# Patient Record
Sex: FEMALE | Race: WHITE | HISPANIC OR LATINO | Employment: FULL TIME | URBAN - METROPOLITAN AREA
[De-identification: names, ages, dates, MRNs, and addresses within clinical notes are randomized per-mention and may not be internally consistent; named-entity substitution may affect disease eponyms.]

---

## 2022-12-07 ENCOUNTER — HOSPITAL ENCOUNTER (OUTPATIENT)
Facility: MEDICAL CENTER | Age: 32
End: 2022-12-07
Attending: MIDWIFE
Payer: COMMERCIAL

## 2022-12-07 LAB
25(OH)D3 SERPL-MCNC: 28 NG/ML (ref 30–100)
ABO GROUP BLD: NORMAL
BASOPHILS # BLD AUTO: 0.4 % (ref 0–1.8)
BASOPHILS # BLD: 0.05 K/UL (ref 0–0.12)
BLD GP AB SCN SERPL QL: NORMAL
EOSINOPHIL # BLD AUTO: 0.14 K/UL (ref 0–0.51)
EOSINOPHIL NFR BLD: 1.1 % (ref 0–6.9)
ERYTHROCYTE [DISTWIDTH] IN BLOOD BY AUTOMATED COUNT: 45.1 FL (ref 35.9–50)
HBV SURFACE AG SER QL: ABNORMAL
HCT VFR BLD AUTO: 39.7 % (ref 37–47)
HGB BLD-MCNC: 13 G/DL (ref 12–16)
IMM GRANULOCYTES # BLD AUTO: 0.14 K/UL (ref 0–0.11)
IMM GRANULOCYTES NFR BLD AUTO: 1.1 % (ref 0–0.9)
LYMPHOCYTES # BLD AUTO: 2.54 K/UL (ref 1–4.8)
LYMPHOCYTES NFR BLD: 20.6 % (ref 22–41)
MCH RBC QN AUTO: 27.8 PG (ref 27–33)
MCHC RBC AUTO-ENTMCNC: 32.7 G/DL (ref 33.6–35)
MCV RBC AUTO: 84.8 FL (ref 81.4–97.8)
MONOCYTES # BLD AUTO: 0.67 K/UL (ref 0–0.85)
MONOCYTES NFR BLD AUTO: 5.4 % (ref 0–13.4)
NEUTROPHILS # BLD AUTO: 8.78 K/UL (ref 2–7.15)
NEUTROPHILS NFR BLD: 71.4 % (ref 44–72)
NRBC # BLD AUTO: 0 K/UL
NRBC BLD-RTO: 0 /100 WBC
PLATELET # BLD AUTO: 225 K/UL (ref 164–446)
PMV BLD AUTO: 10.5 FL (ref 9–12.9)
RBC # BLD AUTO: 4.68 M/UL (ref 4.2–5.4)
RH BLD: NORMAL
RUBV AB SER QL: 161 IU/ML
T PALLIDUM AB SER QL IA: ABNORMAL
WBC # BLD AUTO: 12.3 K/UL (ref 4.8–10.8)

## 2022-12-07 PROCEDURE — 85025 COMPLETE CBC W/AUTO DIFF WBC: CPT

## 2022-12-07 PROCEDURE — 86780 TREPONEMA PALLIDUM: CPT

## 2022-12-07 PROCEDURE — 86901 BLOOD TYPING SEROLOGIC RH(D): CPT

## 2022-12-07 PROCEDURE — 86592 SYPHILIS TEST NON-TREP QUAL: CPT

## 2022-12-07 PROCEDURE — 86900 BLOOD TYPING SEROLOGIC ABO: CPT

## 2022-12-07 PROCEDURE — 87340 HEPATITIS B SURFACE AG IA: CPT

## 2022-12-07 PROCEDURE — 86762 RUBELLA ANTIBODY: CPT

## 2022-12-07 PROCEDURE — 86850 RBC ANTIBODY SCREEN: CPT

## 2022-12-07 PROCEDURE — 82306 VITAMIN D 25 HYDROXY: CPT

## 2023-03-01 ENCOUNTER — HOSPITAL ENCOUNTER (OUTPATIENT)
Facility: MEDICAL CENTER | Age: 33
End: 2023-03-01
Attending: OBSTETRICS & GYNECOLOGY
Payer: COMMERCIAL

## 2023-03-01 LAB
GLUCOSE SERPL-MCNC: 106 MG/DL (ref 65–99)
IRON SERPL-MCNC: 86 UG/DL (ref 40–170)

## 2023-03-01 PROCEDURE — 83540 ASSAY OF IRON: CPT

## 2023-03-01 PROCEDURE — 82947 ASSAY GLUCOSE BLOOD QUANT: CPT

## 2023-05-10 ENCOUNTER — HOSPITAL ENCOUNTER (OUTPATIENT)
Facility: MEDICAL CENTER | Age: 33
End: 2023-05-10
Attending: MIDWIFE
Payer: COMMERCIAL

## 2023-05-10 PROCEDURE — 87081 CULTURE SCREEN ONLY: CPT

## 2023-05-10 PROCEDURE — 87150 DNA/RNA AMPLIFIED PROBE: CPT

## 2023-05-11 LAB — GP B STREP DNA SPEC QL NAA+PROBE: NEGATIVE

## 2023-05-28 ENCOUNTER — HOSPITAL ENCOUNTER (INPATIENT)
Facility: MEDICAL CENTER | Age: 33
LOS: 5 days | End: 2023-06-02
Attending: OBSTETRICS & GYNECOLOGY | Admitting: OBSTETRICS & GYNECOLOGY
Payer: COMMERCIAL

## 2023-05-28 DIAGNOSIS — G89.18 ACUTE POST-OPERATIVE PAIN: ICD-10-CM

## 2023-05-28 DIAGNOSIS — O92.70 LACTATION PROBLEM: Primary | ICD-10-CM

## 2023-05-28 PROCEDURE — 85025 COMPLETE CBC W/AUTO DIFF WBC: CPT

## 2023-05-28 PROCEDURE — 36415 COLL VENOUS BLD VENIPUNCTURE: CPT

## 2023-05-28 PROCEDURE — 86780 TREPONEMA PALLIDUM: CPT

## 2023-05-28 PROCEDURE — 770002 HCHG ROOM/CARE - OB PRIVATE (112)

## 2023-05-28 PROCEDURE — 302449 STATCHG TRIAGE ONLY (STATISTIC)

## 2023-05-28 RX ORDER — TERBUTALINE SULFATE 1 MG/ML
0.25 INJECTION, SOLUTION SUBCUTANEOUS
Status: DISCONTINUED | OUTPATIENT
Start: 2023-05-28 | End: 2023-05-30 | Stop reason: HOSPADM

## 2023-05-28 RX ORDER — MISOPROSTOL 200 UG/1
800 TABLET ORAL
Status: DISCONTINUED | OUTPATIENT
Start: 2023-05-28 | End: 2023-05-30 | Stop reason: HOSPADM

## 2023-05-28 RX ORDER — ACETAMINOPHEN 500 MG
1000 TABLET ORAL
Status: COMPLETED | OUTPATIENT
Start: 2023-05-28 | End: 2023-05-29

## 2023-05-28 RX ORDER — OXYTOCIN 10 [USP'U]/ML
10 INJECTION, SOLUTION INTRAMUSCULAR; INTRAVENOUS
Status: DISCONTINUED | OUTPATIENT
Start: 2023-05-28 | End: 2023-05-30 | Stop reason: HOSPADM

## 2023-05-28 RX ORDER — IBUPROFEN 800 MG/1
800 TABLET ORAL
Status: DISCONTINUED | OUTPATIENT
Start: 2023-05-28 | End: 2023-05-30 | Stop reason: HOSPADM

## 2023-05-28 RX ORDER — LIDOCAINE HYDROCHLORIDE 10 MG/ML
20 INJECTION, SOLUTION INFILTRATION; PERINEURAL
Status: DISCONTINUED | OUTPATIENT
Start: 2023-05-28 | End: 2023-05-30 | Stop reason: HOSPADM

## 2023-05-28 RX ORDER — SODIUM CHLORIDE, SODIUM LACTATE, POTASSIUM CHLORIDE, CALCIUM CHLORIDE 600; 310; 30; 20 MG/100ML; MG/100ML; MG/100ML; MG/100ML
INJECTION, SOLUTION INTRAVENOUS CONTINUOUS
Status: DISCONTINUED | OUTPATIENT
Start: 2023-05-28 | End: 2023-05-30 | Stop reason: HOSPADM

## 2023-05-28 RX ORDER — METHYLERGONOVINE MALEATE 0.2 MG/ML
0.2 INJECTION INTRAVENOUS
Status: COMPLETED | OUTPATIENT
Start: 2023-05-28 | End: 2023-05-29

## 2023-05-28 ASSESSMENT — PATIENT HEALTH QUESTIONNAIRE - PHQ9
1. LITTLE INTEREST OR PLEASURE IN DOING THINGS: NOT AT ALL
2. FEELING DOWN, DEPRESSED, IRRITABLE, OR HOPELESS: NOT AT ALL
SUM OF ALL RESPONSES TO PHQ9 QUESTIONS 1 AND 2: 0

## 2023-05-28 ASSESSMENT — LIFESTYLE VARIABLES
TOTAL SCORE: 0
ALCOHOL_USE: NO
HAVE PEOPLE ANNOYED YOU BY CRITICIZING YOUR DRINKING: NO
ON A TYPICAL DAY WHEN YOU DRINK ALCOHOL HOW MANY DRINKS DO YOU HAVE: 0
EVER FELT BAD OR GUILTY ABOUT YOUR DRINKING: NO
CONSUMPTION TOTAL: NEGATIVE
TOTAL SCORE: 0
DOES PATIENT WANT TO STOP DRINKING: NO
HAVE YOU EVER FELT YOU SHOULD CUT DOWN ON YOUR DRINKING: NO
TOTAL SCORE: 0
EVER_SMOKED: NEVER
HOW MANY TIMES IN THE PAST YEAR HAVE YOU HAD 5 OR MORE DRINKS IN A DAY: 0
AVERAGE NUMBER OF DAYS PER WEEK YOU HAVE A DRINK CONTAINING ALCOHOL: 0
EVER HAD A DRINK FIRST THING IN THE MORNING TO STEADY YOUR NERVES TO GET RID OF A HANGOVER: NO

## 2023-05-28 ASSESSMENT — PAIN DESCRIPTION - PAIN TYPE: TYPE: ACUTE PAIN

## 2023-05-29 ENCOUNTER — ANESTHESIA (OUTPATIENT)
Dept: OBGYN | Facility: MEDICAL CENTER | Age: 33
End: 2023-05-29
Payer: COMMERCIAL

## 2023-05-29 ENCOUNTER — ANESTHESIA EVENT (OUTPATIENT)
Dept: OBGYN | Facility: MEDICAL CENTER | Age: 33
End: 2023-05-29
Payer: COMMERCIAL

## 2023-05-29 LAB
BASOPHILS # BLD AUTO: 0.6 % (ref 0–1.8)
BASOPHILS # BLD: 0.07 K/UL (ref 0–0.12)
EOSINOPHIL # BLD AUTO: 0.06 K/UL (ref 0–0.51)
EOSINOPHIL NFR BLD: 0.5 % (ref 0–6.9)
ERYTHROCYTE [DISTWIDTH] IN BLOOD BY AUTOMATED COUNT: 41.5 FL (ref 35.9–50)
HCT VFR BLD AUTO: 40 % (ref 37–47)
HCV AB SER QL: NORMAL
HGB BLD-MCNC: 13.5 G/DL (ref 12–16)
HIV 1+2 AB+HIV1 P24 AG SERPL QL IA: NORMAL
HOLDING TUBE BB 8507: NORMAL
IMM GRANULOCYTES # BLD AUTO: 0.12 K/UL (ref 0–0.11)
IMM GRANULOCYTES NFR BLD AUTO: 1 % (ref 0–0.9)
LYMPHOCYTES # BLD AUTO: 2.7 K/UL (ref 1–4.8)
LYMPHOCYTES NFR BLD: 23.5 % (ref 22–41)
MCH RBC QN AUTO: 29.2 PG (ref 27–33)
MCHC RBC AUTO-ENTMCNC: 33.8 G/DL (ref 32.2–35.5)
MCV RBC AUTO: 86.6 FL (ref 81.4–97.8)
MONOCYTES # BLD AUTO: 1.16 K/UL (ref 0–0.85)
MONOCYTES NFR BLD AUTO: 10.1 % (ref 0–13.4)
NEUTROPHILS # BLD AUTO: 7.36 K/UL (ref 1.82–7.42)
NEUTROPHILS NFR BLD: 64.3 % (ref 44–72)
NRBC # BLD AUTO: 0 K/UL
NRBC BLD-RTO: 0 /100 WBC (ref 0–0.2)
PLATELET # BLD AUTO: 226 K/UL (ref 164–446)
PMV BLD AUTO: 11.3 FL (ref 9–12.9)
RBC # BLD AUTO: 4.62 M/UL (ref 4.2–5.4)
T PALLIDUM AB SER QL IA: NORMAL
WBC # BLD AUTO: 11.5 K/UL (ref 4.8–10.8)

## 2023-05-29 PROCEDURE — 36415 COLL VENOUS BLD VENIPUNCTURE: CPT

## 2023-05-29 PROCEDURE — 700102 HCHG RX REV CODE 250 W/ 637 OVERRIDE(OP): Performed by: OBSTETRICS & GYNECOLOGY

## 2023-05-29 PROCEDURE — 700111 HCHG RX REV CODE 636 W/ 250 OVERRIDE (IP): Performed by: OBSTETRICS & GYNECOLOGY

## 2023-05-29 PROCEDURE — 700101 HCHG RX REV CODE 250: Performed by: ANESTHESIOLOGY

## 2023-05-29 PROCEDURE — 160041 HCHG SURGERY MINUTES - EA ADDL 1 MIN LEVEL 4: Performed by: OBSTETRICS & GYNECOLOGY

## 2023-05-29 PROCEDURE — 160029 HCHG SURGERY MINUTES - 1ST 30 MINS LEVEL 4: Performed by: OBSTETRICS & GYNECOLOGY

## 2023-05-29 PROCEDURE — 700111 HCHG RX REV CODE 636 W/ 250 OVERRIDE (IP): Performed by: ANESTHESIOLOGY

## 2023-05-29 PROCEDURE — 700111 HCHG RX REV CODE 636 W/ 250 OVERRIDE (IP): Performed by: PHYSICIAN ASSISTANT

## 2023-05-29 PROCEDURE — 59514 CESAREAN DELIVERY ONLY: CPT | Mod: AS | Performed by: ADVANCED PRACTICE MIDWIFE

## 2023-05-29 PROCEDURE — 87389 HIV-1 AG W/HIV-1&-2 AB AG IA: CPT

## 2023-05-29 PROCEDURE — 160048 HCHG OR STATISTICAL LEVEL 1-5: Performed by: OBSTETRICS & GYNECOLOGY

## 2023-05-29 PROCEDURE — 59514 CESAREAN DELIVERY ONLY: CPT | Performed by: OBSTETRICS & GYNECOLOGY

## 2023-05-29 PROCEDURE — 86803 HEPATITIS C AB TEST: CPT

## 2023-05-29 PROCEDURE — 01967 NEURAXL LBR ANES VAG DLVR: CPT | Performed by: ANESTHESIOLOGY

## 2023-05-29 PROCEDURE — 303615 HCHG EPIDURAL/SPINAL ANESTHESIA FOR LABOR

## 2023-05-29 PROCEDURE — 160035 HCHG PACU - 1ST 60 MINS PHASE I: Performed by: OBSTETRICS & GYNECOLOGY

## 2023-05-29 PROCEDURE — 160002 HCHG RECOVERY MINUTES (STAT): Performed by: OBSTETRICS & GYNECOLOGY

## 2023-05-29 PROCEDURE — A9270 NON-COVERED ITEM OR SERVICE: HCPCS | Performed by: OBSTETRICS & GYNECOLOGY

## 2023-05-29 PROCEDURE — A9270 NON-COVERED ITEM OR SERVICE: HCPCS | Performed by: PHYSICIAN ASSISTANT

## 2023-05-29 PROCEDURE — 700105 HCHG RX REV CODE 258: Performed by: PHYSICIAN ASSISTANT

## 2023-05-29 PROCEDURE — 700102 HCHG RX REV CODE 250 W/ 637 OVERRIDE(OP): Performed by: ANESTHESIOLOGY

## 2023-05-29 PROCEDURE — A9270 NON-COVERED ITEM OR SERVICE: HCPCS | Performed by: ANESTHESIOLOGY

## 2023-05-29 PROCEDURE — C1755 CATHETER, INTRASPINAL: HCPCS | Performed by: OBSTETRICS & GYNECOLOGY

## 2023-05-29 PROCEDURE — 700102 HCHG RX REV CODE 250 W/ 637 OVERRIDE(OP): Performed by: PHYSICIAN ASSISTANT

## 2023-05-29 PROCEDURE — 700105 HCHG RX REV CODE 258: Performed by: OBSTETRICS & GYNECOLOGY

## 2023-05-29 PROCEDURE — 770002 HCHG ROOM/CARE - OB PRIVATE (112)

## 2023-05-29 PROCEDURE — 01968 ANES/ANALG CS DLVR NEURAXIAL: CPT | Performed by: ANESTHESIOLOGY

## 2023-05-29 PROCEDURE — 160009 HCHG ANES TIME/MIN: Performed by: OBSTETRICS & GYNECOLOGY

## 2023-05-29 RX ORDER — HALOPERIDOL 5 MG/ML
1 INJECTION INTRAMUSCULAR
Status: CANCELLED | OUTPATIENT
Start: 2023-05-29

## 2023-05-29 RX ORDER — SODIUM CHLORIDE 9 MG/ML
INJECTION, SOLUTION INTRAVENOUS
Status: COMPLETED
Start: 2023-05-29 | End: 2023-05-29

## 2023-05-29 RX ORDER — METOCLOPRAMIDE HYDROCHLORIDE 5 MG/ML
10 INJECTION INTRAMUSCULAR; INTRAVENOUS ONCE
Status: COMPLETED | OUTPATIENT
Start: 2023-05-29 | End: 2023-05-29

## 2023-05-29 RX ORDER — SODIUM CHLORIDE, SODIUM GLUCONATE, SODIUM ACETATE, POTASSIUM CHLORIDE AND MAGNESIUM CHLORIDE 526; 502; 368; 37; 30 MG/100ML; MG/100ML; MG/100ML; MG/100ML; MG/100ML
1000 INJECTION, SOLUTION INTRAVENOUS ONCE
Status: DISCONTINUED | OUTPATIENT
Start: 2023-05-29 | End: 2023-05-29 | Stop reason: CLARIF

## 2023-05-29 RX ORDER — CLINDAMYCIN PHOSPHATE 900 MG/50ML
900 INJECTION, SOLUTION INTRAVENOUS EVERY 8 HOURS
Status: COMPLETED | OUTPATIENT
Start: 2023-05-29 | End: 2023-05-30

## 2023-05-29 RX ORDER — AZITHROMYCIN 500 MG/5ML
500 INJECTION, POWDER, LYOPHILIZED, FOR SOLUTION INTRAVENOUS EVERY 24 HOURS
Status: DISCONTINUED | OUTPATIENT
Start: 2023-05-30 | End: 2023-05-29

## 2023-05-29 RX ORDER — OXYCODONE HCL 5 MG/5 ML
5 SOLUTION, ORAL ORAL
Status: CANCELLED | OUTPATIENT
Start: 2023-05-29

## 2023-05-29 RX ORDER — DIPHENHYDRAMINE HYDROCHLORIDE 50 MG/ML
12.5 INJECTION INTRAMUSCULAR; INTRAVENOUS
Status: CANCELLED | OUTPATIENT
Start: 2023-05-29

## 2023-05-29 RX ORDER — PHENYLEPHRINE HYDROCHLORIDE 10 MG/ML
INJECTION, SOLUTION INTRAMUSCULAR; INTRAVENOUS; SUBCUTANEOUS PRN
Status: DISCONTINUED | OUTPATIENT
Start: 2023-05-29 | End: 2023-05-29 | Stop reason: SURG

## 2023-05-29 RX ORDER — HYDROMORPHONE HYDROCHLORIDE 1 MG/ML
0.2 INJECTION, SOLUTION INTRAMUSCULAR; INTRAVENOUS; SUBCUTANEOUS
Status: CANCELLED | OUTPATIENT
Start: 2023-05-29

## 2023-05-29 RX ORDER — LABETALOL HYDROCHLORIDE 5 MG/ML
5 INJECTION, SOLUTION INTRAVENOUS
Status: CANCELLED | OUTPATIENT
Start: 2023-05-29

## 2023-05-29 RX ORDER — ONDANSETRON 2 MG/ML
4 INJECTION INTRAMUSCULAR; INTRAVENOUS
Status: CANCELLED | OUTPATIENT
Start: 2023-05-29

## 2023-05-29 RX ORDER — METOCLOPRAMIDE HYDROCHLORIDE 5 MG/ML
10 INJECTION INTRAMUSCULAR; INTRAVENOUS ONCE
Status: DISCONTINUED | OUTPATIENT
Start: 2023-05-29 | End: 2023-05-29 | Stop reason: CLARIF

## 2023-05-29 RX ORDER — OXYCODONE HYDROCHLORIDE 10 MG/1
10 TABLET ORAL EVERY 4 HOURS PRN
Status: ACTIVE | OUTPATIENT
Start: 2023-05-29 | End: 2023-05-30

## 2023-05-29 RX ORDER — ONDANSETRON 2 MG/ML
INJECTION INTRAMUSCULAR; INTRAVENOUS PRN
Status: DISCONTINUED | OUTPATIENT
Start: 2023-05-29 | End: 2023-05-29 | Stop reason: SURG

## 2023-05-29 RX ORDER — HYDROMORPHONE HYDROCHLORIDE 1 MG/ML
0.2 INJECTION, SOLUTION INTRAMUSCULAR; INTRAVENOUS; SUBCUTANEOUS
Status: ACTIVE | OUTPATIENT
Start: 2023-05-29 | End: 2023-05-30

## 2023-05-29 RX ORDER — MEPERIDINE HYDROCHLORIDE 25 MG/ML
6.25 INJECTION INTRAMUSCULAR; INTRAVENOUS; SUBCUTANEOUS
Status: CANCELLED | OUTPATIENT
Start: 2023-05-29

## 2023-05-29 RX ORDER — DIPHENHYDRAMINE HYDROCHLORIDE 50 MG/ML
12.5 INJECTION INTRAMUSCULAR; INTRAVENOUS EVERY 6 HOURS PRN
Status: ACTIVE | OUTPATIENT
Start: 2023-05-29 | End: 2023-05-30

## 2023-05-29 RX ORDER — HYDROMORPHONE HYDROCHLORIDE 1 MG/ML
0.4 INJECTION, SOLUTION INTRAMUSCULAR; INTRAVENOUS; SUBCUTANEOUS
Status: ACTIVE | OUTPATIENT
Start: 2023-05-29 | End: 2023-05-30

## 2023-05-29 RX ORDER — METHYLERGONOVINE MALEATE 0.2 MG/ML
INJECTION INTRAVENOUS PRN
Status: DISCONTINUED | OUTPATIENT
Start: 2023-05-29 | End: 2023-05-29 | Stop reason: SURG

## 2023-05-29 RX ORDER — LIDOCAINE HYDROCHLORIDE AND EPINEPHRINE 15; 5 MG/ML; UG/ML
INJECTION, SOLUTION EPIDURAL
Status: COMPLETED | OUTPATIENT
Start: 2023-05-29 | End: 2023-05-29

## 2023-05-29 RX ORDER — EPHEDRINE SULFATE 50 MG/ML
10 INJECTION, SOLUTION INTRAVENOUS
Status: ACTIVE | OUTPATIENT
Start: 2023-05-29 | End: 2023-05-30

## 2023-05-29 RX ORDER — MORPHINE SULFATE 0.5 MG/ML
INJECTION, SOLUTION EPIDURAL; INTRATHECAL; INTRAVENOUS PRN
Status: DISCONTINUED | OUTPATIENT
Start: 2023-05-29 | End: 2023-05-29 | Stop reason: SURG

## 2023-05-29 RX ORDER — ACETAMINOPHEN 500 MG
1000 TABLET ORAL EVERY 6 HOURS
Status: DISPENSED | OUTPATIENT
Start: 2023-05-29 | End: 2023-05-30

## 2023-05-29 RX ORDER — HYDROMORPHONE HYDROCHLORIDE 1 MG/ML
0.4 INJECTION, SOLUTION INTRAMUSCULAR; INTRAVENOUS; SUBCUTANEOUS
Status: CANCELLED | OUTPATIENT
Start: 2023-05-29

## 2023-05-29 RX ORDER — EPHEDRINE SULFATE 50 MG/ML
5 INJECTION, SOLUTION INTRAVENOUS
Status: CANCELLED | OUTPATIENT
Start: 2023-05-29

## 2023-05-29 RX ORDER — CLINDAMYCIN PHOSPHATE 600 MG/50ML
600 INJECTION, SOLUTION INTRAVENOUS EVERY 8 HOURS
Status: DISCONTINUED | OUTPATIENT
Start: 2023-05-29 | End: 2023-05-29

## 2023-05-29 RX ORDER — CITRIC ACID/SODIUM CITRATE 334-500MG
30 SOLUTION, ORAL ORAL ONCE
Status: DISCONTINUED | OUTPATIENT
Start: 2023-05-29 | End: 2023-05-29 | Stop reason: CLARIF

## 2023-05-29 RX ORDER — ONDANSETRON 2 MG/ML
4 INJECTION INTRAMUSCULAR; INTRAVENOUS EVERY 6 HOURS PRN
Status: ACTIVE | OUTPATIENT
Start: 2023-05-29 | End: 2023-05-30

## 2023-05-29 RX ORDER — DEXTROSE, SODIUM CHLORIDE, SODIUM LACTATE, POTASSIUM CHLORIDE, AND CALCIUM CHLORIDE 5; .6; .31; .03; .02 G/100ML; G/100ML; G/100ML; G/100ML; G/100ML
INJECTION, SOLUTION INTRAVENOUS CONTINUOUS
Status: DISCONTINUED | OUTPATIENT
Start: 2023-05-29 | End: 2023-05-30

## 2023-05-29 RX ORDER — SODIUM CHLORIDE, SODIUM LACTATE, POTASSIUM CHLORIDE, AND CALCIUM CHLORIDE .6; .31; .03; .02 G/100ML; G/100ML; G/100ML; G/100ML
1000 INJECTION, SOLUTION INTRAVENOUS
Status: DISCONTINUED | OUTPATIENT
Start: 2023-05-29 | End: 2023-05-30 | Stop reason: HOSPADM

## 2023-05-29 RX ORDER — AZITHROMYCIN 500 MG/5ML
500 INJECTION, POWDER, LYOPHILIZED, FOR SOLUTION INTRAVENOUS ONCE
Status: COMPLETED | OUTPATIENT
Start: 2023-05-29 | End: 2023-05-30

## 2023-05-29 RX ORDER — BUPIVACAINE HYDROCHLORIDE 7.5 MG/ML
INJECTION, SOLUTION INTRASPINAL PRN
Status: DISCONTINUED | OUTPATIENT
Start: 2023-05-29 | End: 2023-05-29 | Stop reason: SURG

## 2023-05-29 RX ORDER — DIPHENHYDRAMINE HYDROCHLORIDE 50 MG/ML
25 INJECTION INTRAMUSCULAR; INTRAVENOUS EVERY 6 HOURS PRN
Status: ACTIVE | OUTPATIENT
Start: 2023-05-29 | End: 2023-05-30

## 2023-05-29 RX ORDER — MIDAZOLAM HYDROCHLORIDE 1 MG/ML
1 INJECTION INTRAMUSCULAR; INTRAVENOUS
Status: CANCELLED | OUTPATIENT
Start: 2023-05-29

## 2023-05-29 RX ORDER — HYDRALAZINE HYDROCHLORIDE 20 MG/ML
5 INJECTION INTRAMUSCULAR; INTRAVENOUS
Status: CANCELLED | OUTPATIENT
Start: 2023-05-29

## 2023-05-29 RX ORDER — ROPIVACAINE HYDROCHLORIDE 2 MG/ML
INJECTION, SOLUTION EPIDURAL; INFILTRATION; PERINEURAL CONTINUOUS
Status: DISCONTINUED | OUTPATIENT
Start: 2023-05-29 | End: 2023-05-30 | Stop reason: HOSPADM

## 2023-05-29 RX ORDER — ROPIVACAINE HYDROCHLORIDE 2 MG/ML
INJECTION, SOLUTION EPIDURAL; INFILTRATION; PERINEURAL
Status: ACTIVE
Start: 2023-05-29 | End: 2023-05-29

## 2023-05-29 RX ORDER — OXYCODONE HYDROCHLORIDE 5 MG/1
5 TABLET ORAL EVERY 4 HOURS PRN
Status: ACTIVE | OUTPATIENT
Start: 2023-05-29 | End: 2023-05-30

## 2023-05-29 RX ORDER — SODIUM CHLORIDE, SODIUM GLUCONATE, SODIUM ACETATE, POTASSIUM CHLORIDE AND MAGNESIUM CHLORIDE 526; 502; 368; 37; 30 MG/100ML; MG/100ML; MG/100ML; MG/100ML; MG/100ML
1000 INJECTION, SOLUTION INTRAVENOUS ONCE
Status: COMPLETED | OUTPATIENT
Start: 2023-05-29 | End: 2023-05-29

## 2023-05-29 RX ORDER — METHYLERGONOVINE MALEATE 0.2 MG/ML
INJECTION INTRAVENOUS
Status: COMPLETED
Start: 2023-05-29 | End: 2023-05-29

## 2023-05-29 RX ORDER — CITRIC ACID/SODIUM CITRATE 334-500MG
30 SOLUTION, ORAL ORAL ONCE
Status: COMPLETED | OUTPATIENT
Start: 2023-05-29 | End: 2023-05-29

## 2023-05-29 RX ORDER — KETOROLAC TROMETHAMINE 30 MG/ML
INJECTION, SOLUTION INTRAMUSCULAR; INTRAVENOUS PRN
Status: DISCONTINUED | OUTPATIENT
Start: 2023-05-29 | End: 2023-05-29 | Stop reason: SURG

## 2023-05-29 RX ORDER — SCOLOPAMINE TRANSDERMAL SYSTEM 1 MG/1
PATCH, EXTENDED RELEASE TRANSDERMAL PRN
Status: DISCONTINUED | OUTPATIENT
Start: 2023-05-29 | End: 2023-05-29 | Stop reason: SURG

## 2023-05-29 RX ORDER — SODIUM CHLORIDE, SODIUM LACTATE, POTASSIUM CHLORIDE, AND CALCIUM CHLORIDE .6; .31; .03; .02 G/100ML; G/100ML; G/100ML; G/100ML
250 INJECTION, SOLUTION INTRAVENOUS PRN
Status: CANCELLED | OUTPATIENT
Start: 2023-05-29

## 2023-05-29 RX ORDER — SODIUM CHLORIDE, SODIUM GLUCONATE, SODIUM ACETATE, POTASSIUM CHLORIDE AND MAGNESIUM CHLORIDE 526; 502; 368; 37; 30 MG/100ML; MG/100ML; MG/100ML; MG/100ML; MG/100ML
INJECTION, SOLUTION INTRAVENOUS
Status: DISCONTINUED | OUTPATIENT
Start: 2023-05-29 | End: 2023-05-29 | Stop reason: SURG

## 2023-05-29 RX ORDER — HYDROMORPHONE HYDROCHLORIDE 1 MG/ML
0.1 INJECTION, SOLUTION INTRAMUSCULAR; INTRAVENOUS; SUBCUTANEOUS
Status: CANCELLED | OUTPATIENT
Start: 2023-05-29

## 2023-05-29 RX ORDER — AMPICILLIN 2 G/1
INJECTION, POWDER, FOR SOLUTION INTRAVENOUS
Status: COMPLETED
Start: 2023-05-29 | End: 2023-05-29

## 2023-05-29 RX ORDER — KETOROLAC TROMETHAMINE 30 MG/ML
30 INJECTION, SOLUTION INTRAMUSCULAR; INTRAVENOUS EVERY 6 HOURS
Status: DISPENSED | OUTPATIENT
Start: 2023-05-29 | End: 2023-05-30

## 2023-05-29 RX ORDER — OXYCODONE HCL 5 MG/5 ML
10 SOLUTION, ORAL ORAL
Status: CANCELLED | OUTPATIENT
Start: 2023-05-29

## 2023-05-29 RX ADMIN — KETOROLAC TROMETHAMINE 30 MG: 30 INJECTION, SOLUTION INTRAMUSCULAR; INTRAVENOUS at 22:47

## 2023-05-29 RX ADMIN — OXYTOCIN 700 ML: 10 INJECTION, SOLUTION INTRAMUSCULAR; INTRAVENOUS at 22:47

## 2023-05-29 RX ADMIN — METOCLOPRAMIDE 10 MG: 5 INJECTION, SOLUTION INTRAMUSCULAR; INTRAVENOUS at 21:42

## 2023-05-29 RX ADMIN — AZITHROMYCIN FOR INJECTION INJECTION, POWDER, LYOPHILIZED, FOR SOLUTION 500 MG: 500 INJECTION INTRAVENOUS at 23:19

## 2023-05-29 RX ADMIN — ACETAMINOPHEN 1000 MG: 500 TABLET, FILM COATED ORAL at 21:23

## 2023-05-29 RX ADMIN — LIDOCAINE HYDROCHLORIDE,EPINEPHRINE BITARTRATE 2 ML: 15; .005 INJECTION, SOLUTION EPIDURAL; INFILTRATION; INTRACAUDAL; PERINEURAL at 07:55

## 2023-05-29 RX ADMIN — AZITHROMYCIN FOR INJECTION INJECTION, POWDER, LYOPHILIZED, FOR SOLUTION 500 MG: 500 INJECTION INTRAVENOUS at 21:59

## 2023-05-29 RX ADMIN — ONDANSETRON 4 MG: 2 INJECTION INTRAMUSCULAR; INTRAVENOUS at 22:47

## 2023-05-29 RX ADMIN — SCOPOLAMINE 1 PATCH: 1.5 PATCH, EXTENDED RELEASE TRANSDERMAL at 22:05

## 2023-05-29 RX ADMIN — GENTAMICIN SULFATE 260 MG: 40 INJECTION, SOLUTION INTRAMUSCULAR; INTRAVENOUS at 23:56

## 2023-05-29 RX ADMIN — MORPHINE SULFATE 100 MCG: 0.5 INJECTION, SOLUTION EPIDURAL; INTRATHECAL; INTRAVENOUS at 21:58

## 2023-05-29 RX ADMIN — FENTANYL CITRATE 10 MCG: 50 INJECTION, SOLUTION INTRAMUSCULAR; INTRAVENOUS at 21:58

## 2023-05-29 RX ADMIN — OXYTOCIN 125 ML/HR: 10 INJECTION, SOLUTION INTRAMUSCULAR; INTRAVENOUS at 23:55

## 2023-05-29 RX ADMIN — FAMOTIDINE 20 MG: 10 INJECTION, SOLUTION INTRAVENOUS at 21:42

## 2023-05-29 RX ADMIN — AMPICILLIN SODIUM 2000 MG: 2 INJECTION, POWDER, FOR SOLUTION INTRAVENOUS at 21:23

## 2023-05-29 RX ADMIN — ROPIVACAINE HYDROCHLORIDE: 2 INJECTION, SOLUTION EPIDURAL; INFILTRATION at 18:09

## 2023-05-29 RX ADMIN — PHENYLEPHRINE HYDROCHLORIDE 100 MCG: 10 INJECTION INTRAVENOUS at 22:27

## 2023-05-29 RX ADMIN — METHYLERGONOVINE MALEATE 200 MCG: 0.2 INJECTION, SOLUTION INTRAMUSCULAR; INTRAVENOUS at 22:25

## 2023-05-29 RX ADMIN — LIDOCAINE HYDROCHLORIDE,EPINEPHRINE BITARTRATE 3 ML: 15; .005 INJECTION, SOLUTION EPIDURAL; INFILTRATION; INTRACAUDAL; PERINEURAL at 07:42

## 2023-05-29 RX ADMIN — ROPIVACAINE HYDROCHLORIDE: 2 INJECTION, SOLUTION EPIDURAL; INFILTRATION at 08:16

## 2023-05-29 RX ADMIN — SODIUM CITRATE AND CITRIC ACID MONOHYDRATE 30 ML: 500; 334 SOLUTION ORAL at 21:42

## 2023-05-29 RX ADMIN — OXYTOCIN 1 MILLI-UNITS/MIN: 10 INJECTION, SOLUTION INTRAMUSCULAR; INTRAVENOUS at 02:37

## 2023-05-29 RX ADMIN — SODIUM CHLORIDE, POTASSIUM CHLORIDE, SODIUM LACTATE AND CALCIUM CHLORIDE: 600; 310; 30; 20 INJECTION, SOLUTION INTRAVENOUS at 15:14

## 2023-05-29 RX ADMIN — PHENYLEPHRINE HYDROCHLORIDE 200 MCG: 10 INJECTION INTRAVENOUS at 22:04

## 2023-05-29 RX ADMIN — SODIUM CHLORIDE, SODIUM GLUCONATE, SODIUM ACETATE, POTASSIUM CHLORIDE AND MAGNESIUM CHLORIDE 1000 ML: 526; 502; 368; 37; 30 INJECTION, SOLUTION INTRAVENOUS at 21:20

## 2023-05-29 RX ADMIN — SODIUM CHLORIDE, POTASSIUM CHLORIDE, SODIUM LACTATE AND CALCIUM CHLORIDE: 600; 310; 30; 20 INJECTION, SOLUTION INTRAVENOUS at 02:28

## 2023-05-29 RX ADMIN — SODIUM CHLORIDE, SODIUM GLUCONATE, SODIUM ACETATE, POTASSIUM CHLORIDE AND MAGNESIUM CHLORIDE: 526; 502; 368; 37; 30 INJECTION, SOLUTION INTRAVENOUS at 21:09

## 2023-05-29 RX ADMIN — BUPIVACAINE HYDROCHLORIDE IN DEXTROSE 1.5 ML: 7.5 INJECTION, SOLUTION SUBARACHNOID at 21:58

## 2023-05-29 ASSESSMENT — PATIENT HEALTH QUESTIONNAIRE - PHQ9
SUM OF ALL RESPONSES TO PHQ9 QUESTIONS 1 AND 2: 0
1. LITTLE INTEREST OR PLEASURE IN DOING THINGS: NOT AT ALL
2. FEELING DOWN, DEPRESSED, IRRITABLE, OR HOPELESS: NOT AT ALL

## 2023-05-29 ASSESSMENT — PAIN DESCRIPTION - PAIN TYPE
TYPE: ACUTE PAIN

## 2023-05-29 ASSESSMENT — PAIN SCALES - GENERAL: PAIN_LEVEL: 0

## 2023-05-29 NOTE — ANESTHESIA PREPROCEDURE EVALUATION
Date: 05/29/23  Procedure: Labor Epidural         Relevant Problems   No relevant active problems       Physical Exam    Airway   Mallampati: I  Neck ROM: full       Cardiovascular - normal exam     Dental - normal exam           Pulmonary   Breath sounds clear to auscultation     Abdominal    Neurological - normal exam                 Anesthesia Plan    ASA 1       Plan - epidural   Neuraxial block will be labor analgesia                  Pertinent diagnostic labs and testing reviewed    Informed Consent:    Anesthetic plan and risks discussed with patient.

## 2023-05-29 NOTE — PROGRESS NOTES
Labor Progress Note:      S:  Pt reports uncomfortable with contractions, desires changing positions.  Epidural placed.    Vitals:    23 1145 23 1200 23 1215 23 1230   BP: 104/70 118/71 107/69 101/64   Pulse: (!) 105 76 98 (!) 105   Temp:       TempSrc:       SpO2:       Weight:       Height:           SVE: 6/80/-2  Forebag palpated and AROM'd with return of clear fluid    FHT: 130/moderate variability/+ accels/early decels  toco:  ctx Q2-3 minutes      A/P: 32 y.o.  @ 39w3d admitted following SROM on  at 05:00 for labor augmentation.   No maternal fever or tachycardia  - labor: active,  - FHT: cat 2  - GBS: neg  -  Rh +, RI    Halle Karimi M.D.

## 2023-05-29 NOTE — H&P
"  History and Physical    Cristiane Lau is a 32 y.o. female  -Para:     Gestational Age:  39w2d  Admitted for:   Active Labor and SROM on  at 5am  Admitted to  [facility] and Delivery.  Patient received prenatal care: At [facility]    HPI: Patient is admitted with the above mentioned Chief Complaint and States   Loss of fluid:   positive  Abdominal Pain:  negative  Uterine Contractions:  positive  Vaginal Bleeding:  negative  Fetal Movement:  normal  Patient denies fever, chills, nausea, vomiting , headache, visual disturbance, or dysuria  No LMP recorded. Patient is pregnant.  Estimated Date of Delivery: None noted.  Final EARL: Not found.    Patient Active Problem List    Diagnosis Date Noted    Labor and delivery, indication for care 2023       Admitting DX: Labor and delivery, indication for care [O75.9]   Pregnancy Complications:  none  OB Risk Factors:   None  Labor State:    SROM and Early latent labor.    History:   has no past medical history on file.     has a past surgical history that includes other and tonsillectomy (Bilateral, 2017).    OB History    Para Term  AB Living   1             SAB IAB Ectopic Molar Multiple Live Births                    # Outcome Date GA Lbr Castillo/2nd Weight Sex Delivery Anes PTL Lv   1 Current                Medications:  No current facility-administered medications on file prior to encounter.     No current outpatient medications on file prior to encounter.       Allergies:  Patient has no allergy information on record.    ROS:   Neuro: negative    Cardiovascular: negative  Gastro intestinal: negative  Genitourinary: negative            Physical Exam:  Pulse 84   Temp 36.3 °C (97.3 °F) (Temporal)   Ht 1.473 m (4' 10\")   Wt 59 kg (130 lb)   SpO2 96%   BMI 27.17 kg/m²   Constitutional: healthy-appearing, Well-developed, well-nourished  No JVD: while supine  HEENT: PERRLA  Breast Exam: negative  Cardio: regular rate " and rhythm  Lung: unlabored respirations, no intercostal retractions or accessory muscle use  Abdomen: abdomen is soft without significant tenderness, masses, organomegaly or guarding  Extremity: extremities, peripheral pulses and reflexes normal    Cervical Exam: 40%  Cervix Dilatation: 2-3  Station: negative 3  Pelvis: Normal  Fetal Assessment: Fetal heart variability: moderate  Fetal Heart Rate decelerations: none  Fetal Heart Rate accelerations: yes  Baseline FHR: 140 per minute  Uterine contractions: irregular, every 5-6 minutes  Estimated Fetal Weight: 3000 - 3500g      Labs:      Prenatal Results    The patient does not have a working EARL. Some results will not display without a working EARL.   General (Most Recent Result)       Test Value Date Time    ABO  O  12/07/22 1338    Rh  POS  12/07/22 1338    Antibody screen  NEG  12/07/22 1338    HbA1c       Chlamydia by PCR       Gonorrhea by PCR       RPR/Syphilus  Non-Reactive  12/07/22 1338    HSV 1/2 by PCR (non-serum)       HSV 1/2 (serum)       HSV 1       HSV 2       HPV (16)       HBsAg  Non-Reactive  12/07/22 1338    HIV-1 HIV-2 Antibodies       Rubella  161.00 IU/mL 12/07/22 1338    Tb                 Pap Smear (Most Recent Result)       Test Value Date Time    Pap smear       Pap smear w/HPV       Pap smear w/CTNG       Pap smar w/HPV CTNG       Pap smear (reflex HPV ACUS)       Pap smear (reflex HPV ASCUS w/CTNG)       Pathology gyn specimen                 Urinalysis (Most Recent Result)       Test Value Date Time    Urinalysis       POC urinalysis       Urine drug screen (w/ conf)       Urine culture ([id number])       Urine Protein/Creatinine Ratio                 Urinalysis, Culture if indicated       Test Value Date Time    Color       Appearance       Specific Gravity       PH       Glucose       Ketones       Protein       Bilirubin       Nitrites       Leukocytes Esterase       Blood       Comment       Culture                 Urine Drug Screen        Test Value Date Time    Amphetamines       Barbiturates       Benzodiazepines       Cocaine       Methadone       Opiates       Oxycodone       Phencylidine       Propoxyphene       Marijuana Metabolite                 1st Trimester       Test Value Date Time    Hgb       Hct       Fasting Glucose Tolerance       GTT, 1 hour       GTT, 2 hours       GTT, 3 hours                 2nd Trimester       Test Value Date Time    Hgb       Hct       AST       ALT       Uric Acid       Fasting Glucose Tolerance       GTT, 1 hour       GTT, 2 hours       GTT, 3 hours                 3rd Trimester       Test Value Date Time    Hgb       Hct       Platelet count       GBS (UGALDE BROTH)       Fasting Glucose Tolerance       GTT, 1 hour       GTT, 2 hous       GTT, 3hours                 Congenital Disease Screening       Test Value Date Time    First Trimester Screen       Quad Screen       BH Electrophoresis       Cystic Fibrosis Carrier Study       SMA       AFP Maternal Serum        AFP Tetra       NIPT                 Legend    ^: Historical                              Assessment:  Gestational Age:  39w2d  Labor State:   Labor, Active  Risk Factors:   Prolonged SROM  Pregnancy Complications: None    Patient Active Problem List    Diagnosis Date Noted    Labor and delivery, indication for care 2023       Plan:   Admitted for: Active Labor, SROM since  at 5am - will augment with Pitocin. Pain control prn. Records from [facility] midwifery reviewed - uncomplicated pregnancy. Anticipate . Will monitor for signs of intraabdominal infection.      YANE Bui.

## 2023-05-29 NOTE — ANESTHESIA PROCEDURE NOTES
Epidural Block    Date/Time: 5/29/2023 7:42 AM    Performed by: Grisel Mike M.D.  Authorized by: Grisel Mike M.D.    Patient Location:  OB  Start Time:  5/29/2023 7:42 AM  Reason for Block: labor analgesia    patient identified, IV checked, site marked, risks and benefits discussed, surgical consent, monitors and equipment checked and pre-op evaluation    Patient Position:  Sitting  Prep: ChloraPrep, patient draped and sterile technique    Monitoring:  Blood pressure, continuous pulse oximetry and heart rate  Approach:  Midline  Location:  L3-L4  Injection Technique:  RAMESH saline  Skin infiltration:  Lidocaine  Strength:  1%  Dose:  3ml  Needle Type:  Tuohy  Needle Gauge:  17 G  Needle Length:  3.5 in  Loss of resistance::  4  Catheter Size:  19 G  Catheter at Skin Depth:  9  Test Dose Result:  Negative

## 2023-05-29 NOTE — PROGRESS NOTES
The patient is Stable - Low risk of patient condition declining or worsening    Shift Goals  Clinical Goals: Delivery of viable male baby  Patient Goals: nonmedicated pain management  Family Goals: support    Progress made toward(s) clinical / shift goals:  non-medicated pain management and support

## 2023-05-29 NOTE — PROGRESS NOTES
2208 UA and TOCO applied. Completing assessment and POC.     2222 SVE performed and charted.    2248 Provider YAN Keene present discussing POC.     2358 Side lying release completed on both sides    0047 comb, birthing ball, and heating pad provided. Discussed various position changes, shower (provider approved), for unmedicated birth.    0237 Pitocin started. Pt. Denies feeling last two contractions that TOCO recorded. Contractions 2-4 minutes apart with varying intensities.    0400 Pt. Encouraged to change positions, completed flying cowgirl, hands and knees, lateral side lying release, throne. Pt. Rates contraction intensity moderate/strong.     0640 side lying release completed. Pt. Breathing through contractions, rates intensity of contractions 7 out of 10.    0700 End of shift report given to Cynthia ANAYA RN. Patient is alert, breathing through contractions and has just requested an epidural. LR bolus initiated. Significant other and midwife Micki are in room. POC discussed, and all questions answered. Care relinquished at this time.

## 2023-05-30 LAB
ERYTHROCYTE [DISTWIDTH] IN BLOOD BY AUTOMATED COUNT: 43.5 FL (ref 35.9–50)
HCT VFR BLD AUTO: 35.9 % (ref 37–47)
HGB BLD-MCNC: 12.1 G/DL (ref 12–16)
MCH RBC QN AUTO: 29.9 PG (ref 27–33)
MCHC RBC AUTO-ENTMCNC: 33.7 G/DL (ref 32.2–35.5)
MCV RBC AUTO: 88.6 FL (ref 81.4–97.8)
PATHOLOGY CONSULT NOTE: NORMAL
PLATELET # BLD AUTO: 164 K/UL (ref 164–446)
PMV BLD AUTO: 10.5 FL (ref 9–12.9)
RBC # BLD AUTO: 4.05 M/UL (ref 4.2–5.4)
WBC # BLD AUTO: 22.2 K/UL (ref 4.8–10.8)

## 2023-05-30 PROCEDURE — 88307 TISSUE EXAM BY PATHOLOGIST: CPT

## 2023-05-30 PROCEDURE — 770002 HCHG ROOM/CARE - OB PRIVATE (112)

## 2023-05-30 PROCEDURE — 85027 COMPLETE CBC AUTOMATED: CPT

## 2023-05-30 PROCEDURE — 700102 HCHG RX REV CODE 250 W/ 637 OVERRIDE(OP): Performed by: OBSTETRICS & GYNECOLOGY

## 2023-05-30 PROCEDURE — 700105 HCHG RX REV CODE 258: Performed by: OBSTETRICS & GYNECOLOGY

## 2023-05-30 PROCEDURE — 700111 HCHG RX REV CODE 636 W/ 250 OVERRIDE (IP): Performed by: ANESTHESIOLOGY

## 2023-05-30 PROCEDURE — 700102 HCHG RX REV CODE 250 W/ 637 OVERRIDE(OP): Performed by: ANESTHESIOLOGY

## 2023-05-30 PROCEDURE — 36415 COLL VENOUS BLD VENIPUNCTURE: CPT

## 2023-05-30 PROCEDURE — 700111 HCHG RX REV CODE 636 W/ 250 OVERRIDE (IP): Performed by: OBSTETRICS & GYNECOLOGY

## 2023-05-30 PROCEDURE — 700101 HCHG RX REV CODE 250: Performed by: OBSTETRICS & GYNECOLOGY

## 2023-05-30 PROCEDURE — A9270 NON-COVERED ITEM OR SERVICE: HCPCS | Performed by: ANESTHESIOLOGY

## 2023-05-30 PROCEDURE — A9270 NON-COVERED ITEM OR SERVICE: HCPCS | Performed by: OBSTETRICS & GYNECOLOGY

## 2023-05-30 RX ORDER — ONDANSETRON 4 MG/1
4 TABLET, ORALLY DISINTEGRATING ORAL EVERY 6 HOURS PRN
Status: DISCONTINUED | OUTPATIENT
Start: 2023-05-30 | End: 2023-06-02 | Stop reason: HOSPADM

## 2023-05-30 RX ORDER — SODIUM CHLORIDE, SODIUM LACTATE, POTASSIUM CHLORIDE, CALCIUM CHLORIDE 600; 310; 30; 20 MG/100ML; MG/100ML; MG/100ML; MG/100ML
INJECTION, SOLUTION INTRAVENOUS PRN
Status: DISCONTINUED | OUTPATIENT
Start: 2023-05-30 | End: 2023-06-02 | Stop reason: HOSPADM

## 2023-05-30 RX ORDER — ACETAMINOPHEN 500 MG
1000 TABLET ORAL EVERY 6 HOURS
Status: DISCONTINUED | OUTPATIENT
Start: 2023-05-30 | End: 2023-06-02 | Stop reason: HOSPADM

## 2023-05-30 RX ORDER — DIPHENHYDRAMINE HYDROCHLORIDE 50 MG/ML
25 INJECTION INTRAMUSCULAR; INTRAVENOUS EVERY 6 HOURS PRN
Status: DISCONTINUED | OUTPATIENT
Start: 2023-05-30 | End: 2023-06-02 | Stop reason: HOSPADM

## 2023-05-30 RX ORDER — ACETAMINOPHEN 500 MG
1000 TABLET ORAL EVERY 6 HOURS PRN
Status: DISCONTINUED | OUTPATIENT
Start: 2023-06-03 | End: 2023-06-02 | Stop reason: HOSPADM

## 2023-05-30 RX ORDER — DIPHENHYDRAMINE HCL 25 MG
25 TABLET ORAL EVERY 6 HOURS PRN
Status: DISCONTINUED | OUTPATIENT
Start: 2023-05-30 | End: 2023-06-02 | Stop reason: HOSPADM

## 2023-05-30 RX ORDER — IBUPROFEN 800 MG/1
800 TABLET ORAL EVERY 8 HOURS PRN
Status: DISCONTINUED | OUTPATIENT
Start: 2023-06-02 | End: 2023-06-02 | Stop reason: HOSPADM

## 2023-05-30 RX ORDER — OXYCODONE HYDROCHLORIDE 10 MG/1
10 TABLET ORAL EVERY 4 HOURS PRN
Status: DISCONTINUED | OUTPATIENT
Start: 2023-05-30 | End: 2023-06-02 | Stop reason: HOSPADM

## 2023-05-30 RX ORDER — IBUPROFEN 800 MG/1
800 TABLET ORAL EVERY 8 HOURS
Status: DISCONTINUED | OUTPATIENT
Start: 2023-05-31 | End: 2023-06-02 | Stop reason: HOSPADM

## 2023-05-30 RX ORDER — OXYCODONE HYDROCHLORIDE 5 MG/1
5 TABLET ORAL EVERY 4 HOURS PRN
Status: DISCONTINUED | OUTPATIENT
Start: 2023-05-30 | End: 2023-06-02 | Stop reason: HOSPADM

## 2023-05-30 RX ORDER — DOCUSATE SODIUM 100 MG/1
100 CAPSULE, LIQUID FILLED ORAL 2 TIMES DAILY PRN
Status: DISCONTINUED | OUTPATIENT
Start: 2023-05-30 | End: 2023-06-02 | Stop reason: HOSPADM

## 2023-05-30 RX ORDER — ONDANSETRON 2 MG/ML
4 INJECTION INTRAMUSCULAR; INTRAVENOUS EVERY 6 HOURS PRN
Status: DISCONTINUED | OUTPATIENT
Start: 2023-05-30 | End: 2023-06-02 | Stop reason: HOSPADM

## 2023-05-30 RX ADMIN — ACETAMINOPHEN 1000 MG: 500 TABLET, FILM COATED ORAL at 03:14

## 2023-05-30 RX ADMIN — CLINDAMYCIN PHOSPHATE 900 MG: 900 INJECTION, SOLUTION INTRAVENOUS at 09:40

## 2023-05-30 RX ADMIN — ACETAMINOPHEN 1000 MG: 500 TABLET, FILM COATED ORAL at 22:21

## 2023-05-30 RX ADMIN — KETOROLAC TROMETHAMINE 30 MG: 30 INJECTION, SOLUTION INTRAMUSCULAR; INTRAVENOUS at 05:22

## 2023-05-30 RX ADMIN — AMPICILLIN SODIUM 2000 MG: 2 INJECTION, POWDER, FOR SOLUTION INTRAVENOUS at 03:43

## 2023-05-30 RX ADMIN — KETOROLAC TROMETHAMINE 30 MG: 30 INJECTION, SOLUTION INTRAMUSCULAR; INTRAVENOUS at 13:43

## 2023-05-30 RX ADMIN — ACETAMINOPHEN 1000 MG: 500 TABLET, FILM COATED ORAL at 16:03

## 2023-05-30 RX ADMIN — CLINDAMYCIN PHOSPHATE 900 MG: 900 INJECTION, SOLUTION INTRAVENOUS at 02:10

## 2023-05-30 RX ADMIN — SODIUM CHLORIDE, POTASSIUM CHLORIDE, SODIUM LACTATE AND CALCIUM CHLORIDE: 600; 310; 30; 20 INJECTION, SOLUTION INTRAVENOUS at 07:38

## 2023-05-30 RX ADMIN — DOCUSATE SODIUM 100 MG: 100 CAPSULE, LIQUID FILLED ORAL at 22:21

## 2023-05-30 RX ADMIN — ACETAMINOPHEN 1000 MG: 500 TABLET, FILM COATED ORAL at 09:34

## 2023-05-30 RX ADMIN — KETOROLAC TROMETHAMINE 30 MG: 30 INJECTION, SOLUTION INTRAMUSCULAR; INTRAVENOUS at 20:02

## 2023-05-30 ASSESSMENT — PAIN DESCRIPTION - PAIN TYPE
TYPE: ACUTE PAIN;SURGICAL PAIN

## 2023-05-30 ASSESSMENT — EDINBURGH POSTNATAL DEPRESSION SCALE (EPDS)
I HAVE BEEN ANXIOUS OR WORRIED FOR NO GOOD REASON: NO, NOT AT ALL
THE THOUGHT OF HARMING MYSELF HAS OCCURRED TO ME: NEVER
I HAVE FELT SAD OR MISERABLE: NOT VERY OFTEN
I HAVE BEEN SO UNHAPPY THAT I HAVE BEEN CRYING: ONLY OCCASIONALLY
I HAVE BEEN SO UNHAPPY THAT I HAVE HAD DIFFICULTY SLEEPING: NOT VERY OFTEN
THINGS HAVE BEEN GETTING ON TOP OF ME: NO, MOST OF THE TIME I HAVE COPED QUITE WELL
I HAVE BLAMED MYSELF UNNECESSARILY WHEN THINGS WENT WRONG: YES, SOME OF THE TIME
I HAVE LOOKED FORWARD WITH ENJOYMENT TO THINGS: AS MUCH AS I EVER DID
I HAVE FELT SCARED OR PANICKY FOR NO GOOD REASON: NO, NOT MUCH
I HAVE BEEN ABLE TO LAUGH AND SEE THE FUNNY SIDE OF THINGS: AS MUCH AS I ALWAYS COULD

## 2023-05-30 NOTE — PROGRESS NOTES
1900- report received from Cynthia SMITH  2100- Brandon PETERSON bedside  0025- Report given to Beulah SMTIH

## 2023-05-30 NOTE — CARE PLAN
The patient is Stable - Low risk of patient condition declining or worsening    Shift Goals  Clinical Goals: Vital signs WNL, fundus firm, lochia WNL, breastfeeding, pain control, ambulation  Patient Goals: pain control  Family Goals: support    Progress made toward(s) clinical / shift goals:    Problem: Knowledge Deficit - Postpartum  Goal: Patient will verbalize and demonstrate understanding of self and infant care  Outcome: Progressing  Note: Patient verbalizes and demonstrates understanding of self care and care of the infant.       Problem: Psychosocial - Postpartum  Goal: Patient will verbalize and demonstrate effective bonding and parenting behavior  Outcome: Progressing  Note: Patient verbalizes and demonstrates effective bonding and parenting behavior.      Problem: Altered Physiologic Condition  Goal: Patient physiologically stable as evidenced by normal lochia, palpable uterine involution and vitals within normal limits  Outcome: Progressing  Note: Patient is physiologically stable as evidenced by normal lochia, firm fundus, and vitals WNL.        Patient is not progressing towards the following goals: N/A

## 2023-05-30 NOTE — ANESTHESIA TIME REPORT
Anesthesia Start and Stop Event Times     Date Time Event    5/29/2023 0732 Ready for Procedure     0732 Anesthesia Start     2249 Anesthesia Stop        Responsible Staff  05/29/23    Name Role Begin End    Grisel Mike M.D. Anesth [id number]        Overtime Reason:  no overtime (within assigned shift)    Comments:

## 2023-05-30 NOTE — ANESTHESIA PROCEDURE NOTES
Spinal Block    Date/Time: 5/29/2023 9:58 PM    Performed by: Grisel Mike M.D.  Authorized by: Grisel Mike M.D.    Start Time:  5/29/2023 9:58 PM  Reason for Block: primary anesthetic    patient identified, IV checked, site marked, risks and benefits discussed, surgical consent, monitors and equipment checked, pre-op evaluation and timeout performed    Patient Position:  Sitting  Prep: ChloraPrep, patient draped and sterile technique    Monitoring:  Blood pressure, continuous pulse oximetry and heart rate  Approach:  Midline  Location:  L3-4  Injection Technique:  Single-shot  Skin infiltration:  Lidocaine  Strength:  1%  Dose:  3ml  Needle Type:  Pencan  Needle Gauge:  25 G  CSF flowing pre/post injection:  Yes  Sensory Level:  T4   Pt c/o breakthrough pain w/ epidural  Prolonged labor w/ e/o chorio  Decision made to remove epidural and proceed w/ spinal

## 2023-05-30 NOTE — PROGRESS NOTES
Obstetrics & Gynecology Post-Delivery Progress Note    Date of Service      32 y.o.  s/p  for failure to progress  Delivery date: 2023      Subjective  Pt reports she is feeling well and has no concerns at this time.     Pain: Well controlled  Bleeding: lochia minimal  Tolerating PO: yes  Voiding: Tristan in place  Ambulating: yes  Passing flatus: Yes  Feeding:  plans to breastfeed      Objective  24hr VS:  Temp:  [36.7 °C (98 °F)-37.9 °C (100.3 °F)] 37.3 °C (99.1 °F)  Pulse:  [] 93  Resp:  [17-18] 18  BP: ()/(51-78) 94/63  SpO2:  [92 %-97 %] 94 %    Physical Exam  Gen: well appearing, no apparent distress, resting comfortably in bed  CV: rrr, no m/r/g  Resp: CTAB, symmetric breath sounds  Abd: soft, nontender, nondistended  Fundus: firm, below umbilicus, and nontender  Incision: dressing clean, dry, intact  Ext: symmetric, no peripheral edema, calves nontender    Labs:  None    Medications  [START ON 2023] ibuprofen, 800 mg, Oral, Q8HRS  acetaminophen, 1,000 mg, Oral, Q6HRS  clindamycin (CLEOCIN) IV, 900 mg, Intravenous, Q8HRS  acetaminophen, 1,000 mg, Oral, Q6HR  ketorolac, 30 mg, Intravenous, Q6HR      PRN medications: LR, [START ON 2023] ibuprofen **FOLLOWED BY** [START ON 2023] ibuprofen, acetaminophen **FOLLOWED BY** [START ON 6/3/2023] acetaminophen, oxyCODONE immediate-release, oxyCODONE immediate release, ondansetron **OR** ondansetron, diphenhydrAMINE **OR** diphenhydrAMINE, docusate sodium, tetanus-dipth-acell pertussis, oxyCODONE immediate-release, oxyCODONE immediate release, HYDROmorphone, HYDROmorphone, ePHEDrine, ondansetron, diphenhydrAMINE, naloxone HCl (NARCAN) 20 mg in  mL infusion, diphenhydrAMINE **OR** diphenhydrAMINE **OR** naloxone HCl (NARCAN) 20 mg in  mL infusion      Assessment/Plan  Cristiane Lau is a 32 y.o.yo  postpartum day #1  s/p  for failure to progress. Patient also developed chorioamnionitis and is  currently on abx.     #Chorioamnionitis  Patient remains afebrile and hemodynamically stable. Currently on clindamycin 900 mg every 8 hrs.   -Continue abx course, needs 1 more dose    - Post care: meeting all goals  - Pain: controlled  - Rh+, Rubella Immune  - Method of Feeding: plans to breastfeed      VTE prophylaxis: none indicated    - Disposition: likely home PPD3-4       Vadim Brunner M.D.

## 2023-05-30 NOTE — CARE PLAN
The patient is Watcher - Medium risk of patient condition declining or worsening    Shift Goals  Clinical Goals: labor progress  Patient Goals: pain control  Family Goals: support    Progress made toward(s) clinical / shift goals:    Problem: Knowledge Deficit - L&D  Goal: Patient and family/caregivers will demonstrate understanding of plan of care, disease process/condition, diagnostic tests and medications  Outcome: Progressing     Problem: Pain - Standard  Goal: Alleviation of pain or a reduction in pain to the patient’s comfort goal  Outcome: Progressing

## 2023-05-30 NOTE — PROGRESS NOTES
0700-Report received from Jeana SILVA RN and care assumed.  0710-Pt requesting epidural placement. Consent signed and pt educated on procedure.   0735-Dr. Mike at bedside for epidural placement.  0815-SVE 3/80/-2. Pt resting comfortably.  1300-Dr. Karimi at bedside. SVE 6/80/-2. AROM of forebag. Clear fluid.  1445-Dr. Karimi/Dr. Taylor updated on EFM tracing. Orders received.  1515-SVE no change. Pitocin decreased to 2 m/u. See MAR.  1750-Dr. Taylor at bedside. SVE no change. IUPC placed.  1900-Report given to Cherelle SMITH

## 2023-05-30 NOTE — ANESTHESIA POSTPROCEDURE EVALUATION
Patient: Cristiane Lau    Procedure Summary     Date: 23 Room / Location: [facility] OR 01 / SURGERY LABOR AND DELIVERY    Anesthesia Start: 732 Anesthesia Stop:     Procedure:  SECTION, PRIMARY (Abdomen) Diagnosis: (arrest of dilation)    Surgeons: Pippa Taylor M.D. Responsible Provider: Grisel Mike M.D.    Anesthesia Type: epidural ASA Status: 1          Final Anesthesia Type: epidural  Last vitals  BP   Blood Pressure: 105/62    Temp   37.7 °C (99.9 °F) (provider bedside, orders received)    Pulse   (!) 113   Resp        SpO2   96 %      Anesthesia Post Evaluation    Patient location during evaluation: PACU  Patient participation: complete - patient participated  Level of consciousness: awake and alert  Pain score: 0    Airway patency: patent  Anesthetic complications: no  Cardiovascular status: adequate  Respiratory status: acceptable  Hydration status: acceptable    PONV: none          No notable events documented.

## 2023-05-30 NOTE — PROGRESS NOTES
"S: Patient is exhausted. She is uncomfortable with her contractions and her epidural doesn't seem like it's working very well.     O:  /62   Pulse (!) 113   Temp 37.7 °C (99.9 °F) (Oral) Comment: provider bedside, orders received  Ht 1.473 m (4' 10\")   Wt 59 kg (130 lb)   SpO2 96%   BMI 27.17 kg/m²     Gen: mild distress  SVE: 6/80/-2    FHT: baseline 150, moderate variability, no accels, no decels  [place]: q2-4 min    A/P: Cristiane is a 33yo G1 at 39w3d who presented following SROM    Labor dystocia  - patient had AROM of a forebag at 1PM for clear fluid and she has made no cervical change after 8 hours, pitocin has been on intermittently, turned off only for recurrent late decelerations  - an IUPC was placed at 6PM, contractions were adequate with adequate MVUs, but pitocin had to be turned off due to recurrent late decelerations    Concern for chorioamnionitis  - maternal temperature has been creeping up and it has been between 99.9 degrees Fahrenheit and 100.3 °F over the last 30 minutes  - tylenol, amp, and gentamicin ordered    Given no cervical change for the last 8 hours, having to turn off pitocin intermittently due to fetal intolerance to labor, and concern for chorioamnionitis, recommend delivery via  for best maternal and fetal outcome. Patient desires to proceed with . Discussed risks including bleeding, infection, and injury to bowel or bladder. All questions answered.    Pippa Taylor M.D.      "

## 2023-05-30 NOTE — LACTATION NOTE
This note was copied from a baby's chart.  Baby 39.3 weeks, , baby 16 hours old- no latch, MOB Hx denies risk factors. Baby is swaddled in friends arms, recommended mother keep baby STS while awake, LC assisted baby STS. LC hand expressed drops & fed back to baby. LC attempted latch, baby sleepy- no latch- see assessment. Educated parents on hand expression, recommended parents hand express & spoon feed back in addition to breastfeeding until milk supply comes in, spoons provided.     Feed baby with feeding cues and at least a minimum of 8x/24 hours.  Expect cluster feeding as this is normal during early days of life and growth spurts.  It is not recommended to let baby sleep longer than 4 hours between feedings and if sleepy, place skin to skin to promote feeding interest and milk production.  Baby's usually feed more frequently and longer when skin to skin with mother.     MOB has [facility], NNB Resource sheet given. Order placed in Epic for OP breastfeeding support.     Breastfeeding plan:  Breastfeed on cue feed a minimum 8 or more times in 24 hours no longer than 3-4 hours from last feed. Hand express & feed back in addition to breastfeeding until milk supply comes in.

## 2023-05-30 NOTE — CARE PLAN
Problem: Pain - Standard  Goal: Alleviation of pain or a reduction in pain to the patient’s comfort goal  Outcome: Progressing     Problem: Knowledge Deficit - Postpartum  Goal: Patient will verbalize and demonstrate understanding of self and infant care  Outcome: Progressing     Problem: Psychosocial - Postpartum  Goal: Patient will verbalize and demonstrate effective bonding and parenting behavior  Outcome: Progressing   The patient is Stable - Low risk of patient condition declining or worsening    Shift Goals  Clinical Goals: vss, void, pain control, breastfeeding  Patient Goals: rest comfort  Family Goals: support    Progress made toward(s) clinical / shift goals:  vss, pain controlled    Patient is not progressing towards the following goals: monitor first void after maldonado dc

## 2023-05-30 NOTE — OP REPORT
PREOPERATIVE DIAGNOSIS:   Failure to progress in the active stage of labor   Chorioamnionitis    POSTOPERATIVE DIAGNOSIS:  Same    PROCEDURE: Primary Low Transverse  Section via Pfannensteil    SURGEON: Pippa Taylor MD    ASSISTANT: Be Nunez CNM    ANESTHESIA: Grisel Mike MD    COMPLICATIONS: none    EBL:  600 cc    FLUIDS: see anesthesia note     URINE OUTPUT: 150 cc    INDICATIONS: Cristiane is a 33yo G1 who presented at 39w2d after ROM for approximately 48 hours. Pitocin was started for augmentation. She had a forebag AROM'd for clear fluid at 1PM today. Due to no change after 5 hours, an IUPC was placed. Pitocin was titrated, but had to be turned off twice due to fetal intolerance of labor due to recurrent late decelerations. Patient made no further cervical change and started to develop a low grade fever, concern for chorioamnionitis. At this time,  was discussed for best maternal and fetal outcome. All questions were answered.    FINDINGS: Viable male infant in cephalic presentation with meconium fluid, apgars 2 and 8, weight 3180g. Normal uterus, tubes, ovaries.     PROCEDURE IN DETAIL: The patient was taken to the operating room where  spinal anesthesia was found to be adequate.  She was then prepped and draped in the normal sterile fashion in the dorsal supine position with a leftward hip tilt.  A Pfannenstiel skin incision was then made with the scalpel and carried through to the underlying layer of fascia, which was nicked in the midline and the incision was extended bluntly.  The superior aspect of the fascial incision was then grasped with Kocher clamps, elevated, and the underlying rectus muscles dissected off sharply.  Attention was then turned to the inferior aspect of this incision which, in a similar fashion, was grasped, tented up with Kocher clamps, and the rectus muscle was dissected off sharply.  The rectus muscles were then  in the midline, and the  peritoneum was identified, tented up, and entered bluntly.  The peritoneal incision was then extended superiorly and inferiorly with good visualization of the bladder.  The bladder blade was then inserted and the vesicouterine peritoneum identified. The lower uterine segment was incised in a transverse fashion with the scalpel.  The uterine incision was then extended laterally with fingers.  The bladder blade was removed and the infant's head was delivered atraumatically.  The remainder of the infant was delivered without difficulty.  The infant's nose and mouth were bulb suctioned on the field.  The cord was clamped and cut.  The infant was then handed off to the awaiting attendant.  The placenta was then removed manually, the uterus exteriorized, and cleared of all clot and debris.  The uterine incision was repaired with 0 Vicryl in a running, locked fashion.  A second layer of the same suture was used to obtain excellent hemostasis. A Bovie was used for further hemostasis.  The uterus was returned to the abdomen and the gutters were cleared of all clot and debris.  The rectus muscle was closed with 1-0 Monocryl in a running fashion.  The fascia was approximated with 0 Vicryl in a running fashion.  The subcuticular fat was irrigated.  Melany's fascia was closed with 2-0 Plain Gut in a running fashion.  The skin was closed with 4-0 Monocryl.  The patient tolerated the procedure well.  Sponge lap and needle counts were correct x3.  The patient was taken to the recovery room in stable condition.    Pippa Taylor M.D.

## 2023-05-30 NOTE — PROGRESS NOTES
0033 Assumed care from L&D. Oriented patient to room, call light, emergency light, tv & bed remote. Discussed skin to skin, observing for feeding cues and on demand feeds. Assessment completed.  Fundus firm, lochia light.  Abdominal incision with gauze pressure dressing intact. Patient will call if pain intervention needed.

## 2023-05-31 ENCOUNTER — PHARMACY VISIT (OUTPATIENT)
Dept: PHARMACY | Facility: MEDICAL CENTER | Age: 33
End: 2023-05-31
Payer: COMMERCIAL

## 2023-05-31 PROCEDURE — 770002 HCHG ROOM/CARE - OB PRIVATE (112)

## 2023-05-31 PROCEDURE — A9270 NON-COVERED ITEM OR SERVICE: HCPCS | Performed by: OBSTETRICS & GYNECOLOGY

## 2023-05-31 PROCEDURE — RXMED WILLOW AMBULATORY MEDICATION CHARGE

## 2023-05-31 PROCEDURE — 700102 HCHG RX REV CODE 250 W/ 637 OVERRIDE(OP): Performed by: OBSTETRICS & GYNECOLOGY

## 2023-05-31 RX ORDER — DOCUSATE SODIUM 100 MG/1
100 CAPSULE, LIQUID FILLED ORAL 2 TIMES DAILY
Qty: 60 CAPSULE | Refills: 1 | Status: SHIPPED | OUTPATIENT
Start: 2023-05-31

## 2023-05-31 RX ORDER — ACETAMINOPHEN 500 MG
1000 TABLET ORAL EVERY 6 HOURS PRN
Qty: 30 TABLET | Refills: 0 | Status: SHIPPED | OUTPATIENT
Start: 2023-05-31 | End: 2023-06-30

## 2023-05-31 RX ORDER — OXYCODONE HYDROCHLORIDE 5 MG/1
5 TABLET ORAL EVERY 4 HOURS PRN
Qty: 7 TABLET | Refills: 0 | Status: SHIPPED | OUTPATIENT
Start: 2023-05-31 | End: 2023-06-07

## 2023-05-31 RX ADMIN — ACETAMINOPHEN 1000 MG: 500 TABLET, FILM COATED ORAL at 04:44

## 2023-05-31 RX ADMIN — IBUPROFEN 800 MG: 800 TABLET, FILM COATED ORAL at 21:12

## 2023-05-31 RX ADMIN — DOCUSATE SODIUM 100 MG: 100 CAPSULE, LIQUID FILLED ORAL at 21:11

## 2023-05-31 RX ADMIN — IBUPROFEN 800 MG: 800 TABLET, FILM COATED ORAL at 11:14

## 2023-05-31 RX ADMIN — IBUPROFEN 800 MG: 800 TABLET, FILM COATED ORAL at 02:27

## 2023-05-31 RX ADMIN — ACETAMINOPHEN 1000 MG: 500 TABLET, FILM COATED ORAL at 11:14

## 2023-05-31 RX ADMIN — ACETAMINOPHEN 1000 MG: 500 TABLET, FILM COATED ORAL at 23:47

## 2023-05-31 RX ADMIN — DOCUSATE SODIUM 100 MG: 100 CAPSULE, LIQUID FILLED ORAL at 11:14

## 2023-05-31 RX ADMIN — ACETAMINOPHEN 1000 MG: 500 TABLET, FILM COATED ORAL at 17:22

## 2023-05-31 ASSESSMENT — PAIN DESCRIPTION - PAIN TYPE
TYPE: ACUTE PAIN;SURGICAL PAIN
TYPE: ACUTE PAIN;SURGICAL PAIN
TYPE: SURGICAL PAIN
TYPE: ACUTE PAIN;SURGICAL PAIN

## 2023-05-31 NOTE — DISCHARGE INSTRUCTIONS
Pelvic rest x6 weeks  Return for follow up visit in 1 week.  Call or come to ED for: heavy vaginal bleeding, fever >100.4, severe abdominal pain, severe headache, chest pain, shortness of breath,  N/V, incisional drainage, or other concerns.     PATIENT DISCHARGE EDUCATION INSTRUCTION SHEET    REASONS TO CALL YOUR OBSTETRICIAN  Persistent fever, shaking, chills (Temperature higher than 100.4) may indicate you have an infection  Heavy bleeding: soaking more than 1 pad per hour; Passing clots an egg-sized clot or bigger may mean you have an postpartum hemorrhage  Foul odor from vagina or bad smelling or discolored discharge or blood  Breast infection (Mastitis symptoms); breast pain, chills, fever, redness or red streaks, may feel flu like symptoms  Urinary pain, burning or frequency  Incision that is not healing, increased redness, swelling, tenderness or pain, or any pus from episiotomy or  site may mean you have an infection  Redness, swelling, warmth, or painful to touch in the calf area of your leg may mean you have a blood clot  Severe or intensified depression, thoughts or feelings of wanting to hurt yourself or someone else   Pain in chest, obstructed breathing or shortness of breath (trouble catching your breath) may mean you are having a postpartum complication. Call your provider immediately   Headache that does not get better, even after taking medicine, a bad headache with vision changes or pain in the upper right area of your belly may mean you have high blood pressure or post birth preeclampsia. Call your provider immediately    HAND WASHING  All family and friends should wash their hands:  Before and after holding the baby  Before feeding the baby  After using the restroom or changing the baby's diaper    WOUND CARE  Ask your physician for additional care instructions. In general:   Incision:  May shower and pat incision dry   Keep the incision clean and dry  There should not be any  opening or pus from the incision  Continue to walk at home 3 times a day   Do NOT lift anything heavier than your baby (over 10 pounds)  Encourage family to help participate in care of the  to allow rest and mom time to heal  Episiotomy/Laceration  May use ginette-spray bottle, witch hazel pads and dermaplast spray for comfort  Use ginette-spray bottle after urinating to cleanse perineal area  To prevent burning during urination spray ginette-water bottle on labial area   Pat perineal area dry until episiotomy/laceration is healed  Continue to use ginette-bottle until bleeding stops as needed  If have a 2nd degree laceration or greater, a Sitz bath can offer relief from soreness, burning, and inflammation   Sitz Bath   Sit in 6 inches of warm water and soak laceration as needed until the laceration heals    VAGINAL CARE AND BLEEDING  Nothing inside vagina for 6 weeks:   No sexual intercourse, tampons or douching  Bleeding may continue for 2-4 weeks. Amount and color may vary  Soaking 1 pad or more in an hour for several hours is considered heavy bleeding  Passing large egg sized blood clots can be concerning  If you feel like you have heavy bleeding or are having increasing amount of blood clots call your Obstetrician immediately  If you begin feeling faint upon standing, feeling sick to your stomach, have clammy skin, a really fast heartbeat, have chills, start feeling confused, dizzy, sleepy or weak, or feeling like you're going to faint call your Obstetrician immediately    HYPERTENSION   Preeclampsia or gestational hypertension are types of high blood pressure that only pregnant women can get. It is important for you to be aware of symptoms to seek early intervention and treatment. If you have any of these symptoms immediately call your Obstetrician    Vision changes or blurred vision   Severe headache or pain that is unrelieved with medication and will not go away  Persistent pain in upper abdomen or shoulder  "  Increased swelling of face, feet, or hands  Difficulty breathing or shortness of breath at rest  Urinating less than usual    URINATION AND BOWEL MOVEMENTS  Eating more fiber (bran cereal, fruits, and vegetables) and drinking plenty of fluids will help to avoid constipation  Urinary frequency and urgency after childbirth is normal  If you experience any urinary pain, burning or frequency call your provider    BIRTH CONTROL  It is possible to become pregnant at any time after delivery and while breastfeeding  Plan to discuss a method of birth control with your physician at your post delivery follow up visit    POSTPARTUM BLUES  During the first few days after birth, you may experience a sense of the \"blues\" which may include impatience, irritability or even crying. These feelings come and go quickly. However, as many as 1 in 10 women experience emotional symptoms known as postpartum depression.     POSTPARTUM DEPRESSION    May start as early as the second or third day after delivery or take several weeks or months to develop. Symptoms of \"blues\" are present, but are more intense: Crying spells; loss of appetite; feelings of hopelessness or loss of control; fear of touching the baby; over concern or no concern at all about the baby; little or no concern about your own appearance/caring for yourself; and/or inability to sleep or excessive sleeping. Contact your Obstetrician if you are experiencing any of these symptoms     PREVENTING SHAKEN BABY  If you are angry or stressed, PUT THE BABY IN THE CRIB, step away, take some deep breaths, and wait until you are calm to care for the baby. DO NOT SHAKE THE BABY. You are not alone, call a supporter for help.  [facility] 24/7 crisis call line ([phone]) or (1-860.934.1880)  You can also text them, text \"ANSWER\" (620345)  "

## 2023-05-31 NOTE — DISCHARGE SUMMARY
Discharge Summary:     Date of Admission: 2023  Date of Discharge: 23      Admitting diagnosis:    1. Pregnancy @ 39w3d      Discharge Diagnosis:   1. Status post  for failure to progress.  2. Chorioamnionitis, treated with clindamycin    History reviewed. No pertinent past medical history.  OB History    Para Term  AB Living   1 1 1     1   SAB IAB Ectopic Molar Multiple Live Births           0 1      # Outcome Date GA Lbr Castillo/2nd Weight Sex Delivery Anes PTL Lv   1 Term 23 39w3d  3.18 kg (7 lb 0.2 oz) M CS-LTranv Spinal, EPI N JOI      Complications: Dysfunctional Labor     Past Surgical History:   Procedure Laterality Date    PRIMARY C SECTION N/A 2023    Procedure:  SECTION, PRIMARY;  Surgeon: Pippa Taylor M.D.;  [facility];  Service: Labor and Delivery    TONSILLECTOMY Bilateral 2017    OTHER       Patient has no allergy information on record.    Patient Active Problem List   Diagnosis   (none) - all problems resolved or deleted       Hospital Course:   Pt is a 32 y.o. now  who presented on 2023 for active labor. Unfortunately, patient made no change in 8 hrs and developed a fever. Decision was made to proceed with  and abx were started for chorioamnionitis. Epidural anesthesia was utilized with moderate effect on pain. Single male infant was delivered via  at 2215. Apgars 2, 8 at 1 and 5 minutes respectively.  ml.     Postpartum course notable for early ambulation, well managed pain, tolerance of diet, spontaneous voiding, and appropriate feeding of infant. She has remained afebrile and blood pressure has been well controlled. All maternal questions and concerns addressed    Physical Exam:  Temp:  [36.3 °C (97.3 °F)-36.6 °C (97.9 °F)] 36.6 °C (97.9 °F)  Pulse:  [67-89] 67  Resp:  [16-18] 16  BP: ()/(49-62) 95/54  SpO2:  [94 %-96 %] 94 %  Physical Exam  General: well appearing, no  apparent distress  Abdomen: soft, nontender, nondistended  Fundus: firm at level below umbilicus  Incision: healing well, no significant drainage, no dehiscence, and no significant erythema  Perineum: Deferred  Extremities: symmetric, no peripheral edema, calves nontender    Current Facility-Administered Medications   Medication Dose    lactated ringers infusion      ibuprofen (MOTRIN) tablet 800 mg  800 mg    Followed by    [START ON 2023] ibuprofen (MOTRIN) tablet 800 mg  800 mg    acetaminophen (TYLENOL) tablet 1,000 mg  1,000 mg    Followed by    [START ON 6/3/2023] acetaminophen (TYLENOL) tablet 1,000 mg  1,000 mg    oxyCODONE immediate-release (ROXICODONE) tablet 5 mg  5 mg    oxyCODONE immediate release (ROXICODONE) tablet 10 mg  10 mg    ondansetron (ZOFRAN) syringe/vial injection 4 mg  4 mg    Or    ondansetron (ZOFRAN ODT) dispertab 4 mg  4 mg    diphenhydrAMINE (BENADRYL) tablet/capsule 25 mg  25 mg    Or    diphenhydrAMINE (BENADRYL) injection 25 mg  25 mg    docusate sodium (COLACE) capsule 100 mg  100 mg    tetanus-dipth-acell pertussis (Tdap) inj 0.5 mL  0.5 mL       Recent Labs     23  2300 23  1257   WBC 11.5* 22.2*   RBC 4.62 4.05*   HEMOGLOBIN 13.5 12.1   HEMATOCRIT 40.0 35.9*   MCV 86.6 88.6   MCH 29.2 29.9   MCHC 33.8 33.7   RDW 41.5 43.5   PLATELETCT 226 164   MPV 11.3 10.5         Activity/ Discharge Instructions::   Discharge to home  Pelvic Rest x 6 weeks  No heavy lifting x4 weeks  Call or come to ED for: heavy vaginal bleeding, fever >100.4, severe abdominal pain, severe headache, chest pain, shortness of breath,  N/V, incisional drainage, or other concerns.       Follow up:  [facility] in 1 week for incision check for  delivery.     Discharge Meds:   Current Outpatient Medications   Medication Sig Dispense Refill    acetaminophen (TYLENOL) 500 MG Tab Take 2 Tablets by mouth every 6 hours as needed for Mild Pain or Moderate Pain for up to 30 days. 30  Tablet 0    oxyCODONE immediate-release (ROXICODONE) 5 MG Tab Take 1 Tablet by mouth every four hours as needed for Severe Pain for up to 7 days. 7 Tablet 0    docusate sodium (COLACE) 100 MG Cap Take 1 Capsule by mouth 2 times a day. 60 Capsule 1           Vadim Brunner M.D.

## 2023-05-31 NOTE — PROGRESS NOTES
Obstetrics & Gynecology Post-Delivery Progress Note    Date of Service      32 y.o.  s/p  for failure to progress  Delivery date: 2023      Subjective  Pt reports she is feeling well and has no concerns at this time and would like to go home.     Pain: Well controlled  Bleeding: lochia minimal  Tolerating PO: yes  Voiding: yes  Ambulating: yes  Passing flatus: Yes  Feeding:  plans to breastfeed      Objective  24hr VS:  Temp:  [36.3 °C (97.3 °F)-36.6 °C (97.9 °F)] 36.6 °C (97.9 °F)  Pulse:  [67-89] 67  Resp:  [16-18] 16  BP: ()/(49-62) 95/54  SpO2:  [94 %-96 %] 94 %    Physical Exam  Gen: well appearing, no apparent distress, resting comfortably in bed  CV: rrr, no m/r/g  Resp: CTAB, symmetric breath sounds  Abd: soft, nontender, nondistended  Fundus: firm, below umbilicus, and nontender  Incision: CDI  Ext: symmetric, no peripheral edema, calves nontender    Labs:  None    Medications  ibuprofen, 800 mg, Oral, Q8HRS  acetaminophen, 1,000 mg, Oral, Q6HRS      PRN medications: LR, ibuprofen **FOLLOWED BY** [START ON 2023] ibuprofen, acetaminophen **FOLLOWED BY** [START ON 6/3/2023] acetaminophen, oxyCODONE immediate-release, oxyCODONE immediate release, ondansetron **OR** ondansetron, diphenhydrAMINE **OR** diphenhydrAMINE, docusate sodium, tetanus-dipth-acell pertussis      Assessment/Plan  Cristiane Lau is a 32 y.o.yo  postpartum day #2  s/p  for failure to progress.     #Chorioamnionitis  Patient remains afebrile and hemodynamically stable, s/p ampicillin x doses and clindamycin x 2 doses.    -No additional abx indicated at this time.   - Post care: meeting all goals  - Pain: controlled  - Rh+, Rubella Immune  - Method of Feeding: plans to breastfeed    VTE prophylaxis: none indicated    - Disposition: Discharge      Vadim Brunner M.D.

## 2023-05-31 NOTE — CARE PLAN
The patient is Stable - Low risk of patient condition declining or worsening    Shift Goals  Clinical Goals: lochia wdl  Patient Goals:     Progress made toward(s) clinical / shift goals:  FF@U with light lochia    Patient is not progressing towards the following goals:

## 2023-05-31 NOTE — LACTATION NOTE
This note was copied from a baby's chart.  Follow-up LC visit, mother following 3 step feeding plan initiated by RN last night for sub-optimal feeding, RN reports able to achieve first good latch earlier this morning. Assisted with breastfeeding attempt, infant interested but demonstrating cheek dimpling and not achieving suction with latch. Reviewed with parents signs of optimal versus sub-optimal breastfeeding. Reviewed pump settings and provided 22.5mm flanges for improved fit. Feeding plan to offer breast first, then supplement with EBM/formula, then double pump breasts - repeat all 3 steps every 3 hours or sooner for hunger cues. Primary RN updated. Parents deny questions/concerns.

## 2023-05-31 NOTE — PROGRESS NOTES
Received bedside report from SARAH Bergeron. Patient in bed, declines any needs at this time. Whiteboards updated., POC discussed. Call light within reach. Patient encouraged to call with any needs and or concerns.

## 2023-05-31 NOTE — PROGRESS NOTES
Assessment done. Vital signs stable. Patient voiding without difficulty, claims to be passing flatus. Fundus firm at umbilicus with light lochia. Steri strips over low abdominal incision clean, dry, and intact. No redness, swelling, or drainage noted. Skin well approximated. Patient ambulating with steady gait. Patient claims to have good pain relief with p.o meds. Breastfeeding infant on demand. Family at bedside. Patient encouraged to call for any needs.

## 2023-06-01 PROCEDURE — 770002 HCHG ROOM/CARE - OB PRIVATE (112)

## 2023-06-01 PROCEDURE — 700102 HCHG RX REV CODE 250 W/ 637 OVERRIDE(OP): Performed by: OBSTETRICS & GYNECOLOGY

## 2023-06-01 PROCEDURE — A9270 NON-COVERED ITEM OR SERVICE: HCPCS | Performed by: OBSTETRICS & GYNECOLOGY

## 2023-06-01 RX ADMIN — IBUPROFEN 800 MG: 800 TABLET, FILM COATED ORAL at 06:23

## 2023-06-01 RX ADMIN — ACETAMINOPHEN 1000 MG: 500 TABLET, FILM COATED ORAL at 06:23

## 2023-06-01 RX ADMIN — IBUPROFEN 800 MG: 800 TABLET, FILM COATED ORAL at 14:04

## 2023-06-01 RX ADMIN — ACETAMINOPHEN 1000 MG: 500 TABLET, FILM COATED ORAL at 23:56

## 2023-06-01 RX ADMIN — ACETAMINOPHEN 1000 MG: 500 TABLET, FILM COATED ORAL at 18:08

## 2023-06-01 RX ADMIN — IBUPROFEN 800 MG: 800 TABLET, FILM COATED ORAL at 21:53

## 2023-06-01 RX ADMIN — ACETAMINOPHEN 1000 MG: 500 TABLET, FILM COATED ORAL at 12:36

## 2023-06-01 ASSESSMENT — PAIN DESCRIPTION - PAIN TYPE
TYPE: ACUTE PAIN

## 2023-06-01 NOTE — PROGRESS NOTES
Bedside report received from SARAH Hui. Pt in bed resting comfortably at this time. Pt able to get up to restroom and void independently, denies need for pain medication at this time. Pt states that she will call if additional pain medication is needed. Whiteboard updated. POC discussed. Call light within reach. All questions and concerns answered at this time, advised to call for assistance as needed.

## 2023-06-01 NOTE — CARE PLAN
The patient is Stable - Low risk of patient condition declining or worsening    Shift Goals  Clinical Goals: VS WDL  Patient Goals: Breast feeding  Family Goals: support    Progress made toward(s) clinical / shift goals:    Problem: Knowledge Deficit - Postpartum  Goal: Patient will verbalize and demonstrate understanding of self and infant care  Outcome: Progressing     Problem: Psychosocial - Postpartum  Goal: Patient will verbalize and demonstrate effective bonding and parenting behavior  Outcome: Progressing     Problem: Altered Physiologic Condition  Goal: Patient physiologically stable as evidenced by normal lochia, palpable uterine involution and vitals within normal limits  Outcome: Progressing     Problem: Infection - Postpartum  Goal: Postpartum patient will be free of signs and symptoms of infection  Outcome: Progressing     Problem: Respiratory/Oxygenation Function Post-Surgical  Goal: Patient will achieve/maintain normal respiratory rate/effort  Outcome: Progressing     Problem: Bowel Elimination - Post Surgical  Goal: Patient will resume regular bowel sounds and function with no discomfort or distention  Outcome: Progressing     Problem: Early Mobilization - Post Surgery  Goal: Early mobilization post surgery  Outcome: Progressing       Patient is not progressing towards the following goals:

## 2023-06-01 NOTE — LACTATION NOTE
This note was copied from a baby's chart.  Follow-up LC visit, mother reports no success with breastfeeding overnight, infant sleepy and did not sustain feedings at breast, following pumping and supplementation plan, infant not yet meeting age based supplementation volumes and spitting up formula. Introduced 24mm nipple shield this morning, reviewed application and care of shield. Attempted latch, infant did a few sucks, roots briefly and appears interested in feeding then falls asleep, held nipple shield in mouth. Reviewed pump use and settings and hand expression techniques, mother able to remove some colostrum this morning, 22.5mm flanges feel comfortable. Reviewed washing of pump parts, bottle parts, and nipple shield and provided milk storage handout. RN Ez assessed bottle feeding and reviewed paced technique with mother, RN switched to Gentlease formula this morning. Mother desires to stay inpatient until tomorrow and continue to have support with feedings. Plan to continue following 3 step feeding plan which includes: offer breast first for up to 15 minutes (with or without nipple shield), then supplement via bottle with EBM/formula per feeding volume guidelines, then double pump and express breasts - repeat all 3 steps every 3 hours or sooner for hunger cues. Peds team notified of feeding concerns at bedside this morning. [facility], also reviewed outpatient resources and recommended HG pump rental at discharge. Mother denies questions/concerns.

## 2023-06-01 NOTE — CARE PLAN
The patient is Stable - Low risk of patient condition declining or worsening    Shift Goals  Clinical Goals: VSS, lochia wnl  Patient Goals: rest comfort  Family Goals: support    Progress made toward(s) clinical / shift goals:  VSS, patient continues to be asymptomatic with soft blood pressures. No signs of lightheadedness, encouraged hydration.     Patient is not progressing towards the following goals:

## 2023-06-01 NOTE — PROGRESS NOTES
Assessment done. Vital signs stable. Patient voiding without difficulty, claims to be passing flatus. Fundus firm 1 below umbilicus with light lochia. Steri strips over low abdominal incision clean, dry, and intact. No redness, swelling, or drainage noted. Patient ambulating with steady gait. Patient claims to have good pain relief with p.o meds. Breastfeeding infant on demand. Family at bedside. Patient encouraged to call for any needs. Will continue to monitor.

## 2023-06-02 VITALS
RESPIRATION RATE: 16 BRPM | HEIGHT: 58 IN | DIASTOLIC BLOOD PRESSURE: 62 MMHG | WEIGHT: 130 LBS | HEART RATE: 63 BPM | BODY MASS INDEX: 27.29 KG/M2 | OXYGEN SATURATION: 97 % | SYSTOLIC BLOOD PRESSURE: 103 MMHG | TEMPERATURE: 98 F

## 2023-06-02 PROCEDURE — A9270 NON-COVERED ITEM OR SERVICE: HCPCS | Performed by: OBSTETRICS & GYNECOLOGY

## 2023-06-02 PROCEDURE — 700102 HCHG RX REV CODE 250 W/ 637 OVERRIDE(OP): Performed by: OBSTETRICS & GYNECOLOGY

## 2023-06-02 RX ADMIN — ACETAMINOPHEN 1000 MG: 500 TABLET, FILM COATED ORAL at 11:55

## 2023-06-02 RX ADMIN — ACETAMINOPHEN 1000 MG: 500 TABLET, FILM COATED ORAL at 05:49

## 2023-06-02 RX ADMIN — IBUPROFEN 800 MG: 800 TABLET, FILM COATED ORAL at 05:48

## 2023-06-02 ASSESSMENT — PAIN DESCRIPTION - PAIN TYPE
TYPE: ACUTE PAIN

## 2023-06-02 NOTE — LACTATION NOTE
This note was copied from a baby's chart.  Follow up:    POB have been compliant with 3 step plan, baby continues to be sleepy at breast, latching occasionally but non sustained.  Parents state speech consult was done this morning and started Dr Mona padilla.  Baby has been taking between 20-25ml Gentlease q3hrs.  No emesis overnight.  MOB continues to pump q3hrs with Ameda breastpump, breasts filling, expressing approx 10-15ml q3hr.    Baby awake and MOB attempting to BF in fooball hold.  Baby awake, alert, cueing but not able to latch.  MOB applied 24mm nipple shield.  Occasional 5-6 short suck bursts, no swallowing.      Discussed supplemental feeding guidelines and goals for supplementation as days of life increase.  Today's goal= 30-40ml.  Baby able to pace bottle feed 25ml while LC in room.     Witnessed pump session with MOB.  Using 22.5mm flanges, reports increased in comfort with flange adjustment.  Currently pumping at 80/60 decreased at 2min.  Suction increased to 30%.  MOB able to hand express after pumping.      Encouraged to rent HG pump at [facility].  FOB to rent pump today.     Reviewed CDC how to store breastmilk  How to clean and store pump parts  [facility] resources  Referral sent to [facility].

## 2023-06-02 NOTE — DISCHARGE SUMMARY
Discharge Summary:     Date of Admission: 2023  Date of Discharge: 2023      Admitting diagnosis:    1. Pregnancy @ 39w3d      Discharge Diagnosis:   1. Status post  for failure to progress.  2. Chorioamnionitis, treated with clindamycin    History reviewed. No pertinent past medical history.  OB History    Para Term  AB Living   1 1 1     1   SAB IAB Ectopic Molar Multiple Live Births           0 1      # Outcome Date GA Lbr Castillo/2nd Weight Sex Delivery Anes PTL Lv   1 Term 23 39w3d  3.18 kg (7 lb 0.2 oz) M CS-LTranv Spinal, EPI N JOI      Complications: Dysfunctional Labor     Past Surgical History:   Procedure Laterality Date    PRIMARY C SECTION N/A 2023    Procedure:  SECTION, PRIMARY;  Surgeon: Pippa Taylor M.D.;  Location: [facility];  Service: Labor and Delivery    TONSILLECTOMY Bilateral 2017    OTHER       Patient has no allergy information on record.    Patient Active Problem List   Diagnosis   (none) - all problems resolved or deleted       Hospital Course:   Pt is a 32 y.o. now  who presented on 2023 for active labor. Unfortunately, patient made no change in 8 hrs and developed a fever. Decision was made to proceed with  and abx were started for chorioamnionitis. Epidural anesthesia was utilized with moderate effect on pain. Single male infant was delivered via  at 2215. Apgars 2, 8 at 1 and 5 minutes respectively.  ml.     Postpartum course notable for early ambulation, well managed pain, tolerance of diet, spontaneous voiding, and appropriate feeding of infant. She has remained afebrile and blood pressure has been well controlled. All maternal questions and concerns addressed    Physical Exam:  Temp:  [36.7 °C (98 °F)] 36.7 °C (98 °F)  Pulse:  [63-76] 63  Resp:  [14-16] 16  BP: ()/(62-68) 103/62  SpO2:  [94 %-97 %] 97 %  Physical Exam  General: well appearing, no apparent  distress  Abdomen: soft, nontender, nondistended  Fundus: firm at level below umbilicus  Incision: healing well, no significant drainage, no dehiscence, and no significant erythema  Perineum: Deferred  Extremities: symmetric, no peripheral edema, calves nontender    Current Facility-Administered Medications   Medication Dose    lactated ringers infusion      ibuprofen (MOTRIN) tablet 800 mg  800 mg    Followed by    ibuprofen (MOTRIN) tablet 800 mg  800 mg    acetaminophen (TYLENOL) tablet 1,000 mg  1,000 mg    Followed by    [START ON 6/3/2023] acetaminophen (TYLENOL) tablet 1,000 mg  1,000 mg    oxyCODONE immediate-release (ROXICODONE) tablet 5 mg  5 mg    oxyCODONE immediate release (ROXICODONE) tablet 10 mg  10 mg    ondansetron (ZOFRAN) syringe/vial injection 4 mg  4 mg    Or    ondansetron (ZOFRAN ODT) dispertab 4 mg  4 mg    diphenhydrAMINE (BENADRYL) tablet/capsule 25 mg  25 mg    Or    diphenhydrAMINE (BENADRYL) injection 25 mg  25 mg    docusate sodium (COLACE) capsule 100 mg  100 mg    tetanus-dipth-acell pertussis (Tdap) inj 0.5 mL  0.5 mL       Recent Labs     23  1257   WBC 22.2*   RBC 4.05*   HEMOGLOBIN 12.1   HEMATOCRIT 35.9*   MCV 88.6   MCH 29.9   MCHC 33.7   RDW 43.5   PLATELETCT 164   MPV 10.5         Activity/ Discharge Instructions::   Discharge to home  Pelvic Rest x 6 weeks  No heavy lifting x4 weeks  Call or come to ED for: heavy vaginal bleeding, fever >100.4, severe abdominal pain, severe headache, chest pain, shortness of breath,  N/V, incisional drainage, or other concerns.       Follow up:  [facility] in 1 week for incision check for  delivery.     Discharge Meds:   Current Outpatient Medications   Medication Sig Dispense Refill    acetaminophen (TYLENOL) 500 MG Tab Take 2 Tablets by mouth every 6 hours as needed for Mild Pain or Moderate Pain for up to 30 days. 30 Tablet 0    oxyCODONE immediate-release (ROXICODONE) 5 MG Tab Take 1 Tablet by mouth every four  hours as needed for Severe Pain for up to 7 days. 7 Tablet 0    docusate sodium (COLACE) 100 MG Cap Take 1 Capsule by mouth 2 times a day. 60 Capsule 1           Vadim Brunner M.D.

## 2023-06-02 NOTE — PROGRESS NOTES
1855 Assumed care of patient at change of shift.  Discussed plan of care with patient and answered all questions.

## 2023-06-02 NOTE — CARE PLAN
The patient is Stable - Low risk of patient condition declining or worsening    Shift Goals  Clinical Goals: Vital signs WNL, fundus firm, lochia WNL  Patient Goals: Breast feeding  Family Goals: support    Progress made toward(s) clinical / shift goals:    Problem: Knowledge Deficit - Postpartum  Goal: Patient will verbalize and demonstrate understanding of self and infant care  Outcome: Progressing  Note: Patient verbalizes and demonstrates understanding of self care and care of the infant.       Problem: Psychosocial - Postpartum  Goal: Patient will verbalize and demonstrate effective bonding and parenting behavior  Outcome: Progressing  Note: Patient verbalizes and demonstrates effective bonding and parenting behavior.      Problem: Altered Physiologic Condition  Goal: Patient physiologically stable as evidenced by normal lochia, palpable uterine involution and vitals within normal limits  Outcome: Progressing  Note: Patient is physiologically stable as evidenced by normal lochia, firm fundus, and vitals WNL.        Patient is not progressing towards the following goals:

## 2023-06-02 NOTE — CARE PLAN
The patient is Stable - Low risk of patient condition declining or worsening    Shift Goals  Clinical Goals: VS WDL  Patient Goals: Home  Family Goals: support    Progress made toward(s) clinical / shift goals:    Problem: Knowledge Deficit - Postpartum  Goal: Patient will verbalize and demonstrate understanding of self and infant care  Outcome: Met     Problem: Psychosocial - Postpartum  Goal: Patient will verbalize and demonstrate effective bonding and parenting behavior  Outcome: Met     Problem: Altered Physiologic Condition  Goal: Patient physiologically stable as evidenced by normal lochia, palpable uterine involution and vitals within normal limits  Outcome: Met     Problem: Infection - Postpartum  Goal: Postpartum patient will be free of signs and symptoms of infection  Outcome: Met     Problem: Respiratory/Oxygenation Function Post-Surgical  Goal: Patient will achieve/maintain normal respiratory rate/effort  Outcome: Met     Problem: Bowel Elimination - Post Surgical  Goal: Patient will resume regular bowel sounds and function with no discomfort or distention  Outcome: Met     Problem: Early Mobilization - Post Surgery  Goal: Early mobilization post surgery  Outcome: Met       Patient is not progressing towards the following goals:

## 2023-06-02 NOTE — PROGRESS NOTES
Assessment done. Vital signs stable. Patient voiding without difficulty, claims to be passing flatus. Fundus firm 1 below umbilicus with scant lochia. Steri strips over low abdominal incision clean, dry, and intact. No redness, swelling, or drainage noted. Skin well approximated. Patient ambulating with steady gait. Patient claims to have good pain relief with p.o meds. Breastfeeding infant on demand. Family at bedside. Patient encouraged to call for any needs. Will continue to monitor.

## 2023-07-06 ENCOUNTER — HOSPITAL ENCOUNTER (OUTPATIENT)
Facility: MEDICAL CENTER | Age: 33
End: 2023-07-06
Attending: OBSTETRICS & GYNECOLOGY
Payer: COMMERCIAL

## 2023-07-06 PROCEDURE — 88175 CYTOPATH C/V AUTO FLUID REDO: CPT

## 2023-07-07 LAB — CYTOLOGY REG CYTOL: NORMAL

## 2024-01-28 ENCOUNTER — OFFICE VISIT (OUTPATIENT)
Dept: URGENT CARE | Facility: CLINIC | Age: 34
End: 2024-01-28
Payer: COMMERCIAL

## 2024-01-28 VITALS
TEMPERATURE: 98.7 F | HEART RATE: 120 BPM | RESPIRATION RATE: 16 BRPM | BODY MASS INDEX: 26.21 KG/M2 | HEIGHT: 59 IN | WEIGHT: 130 LBS | DIASTOLIC BLOOD PRESSURE: 52 MMHG | SYSTOLIC BLOOD PRESSURE: 96 MMHG | OXYGEN SATURATION: 98 %

## 2024-01-28 DIAGNOSIS — R11.2 NAUSEA AND VOMITING, UNSPECIFIED VOMITING TYPE: ICD-10-CM

## 2024-01-28 DIAGNOSIS — E86.0 DEHYDRATION: ICD-10-CM

## 2024-01-28 LAB
APPEARANCE UR: NORMAL
BILIRUB UR STRIP-MCNC: NORMAL MG/DL
COLOR UR AUTO: NORMAL
FLUAV RNA SPEC QL NAA+PROBE: NEGATIVE
FLUBV RNA SPEC QL NAA+PROBE: NEGATIVE
GLUCOSE UR STRIP.AUTO-MCNC: NEGATIVE MG/DL
KETONES UR STRIP.AUTO-MCNC: 80 MG/DL
LEUKOCYTE ESTERASE UR QL STRIP.AUTO: NEGATIVE
NITRITE UR QL STRIP.AUTO: NEGATIVE
PH UR STRIP.AUTO: 6 [PH] (ref 5–8)
POCT INT CON NEG: NEGATIVE
POCT INT CON POS: POSITIVE
POCT URINE PREGNANCY TEST: NEGATIVE
PROT UR QL STRIP: 30 MG/DL
RBC UR QL AUTO: NORMAL
RSV RNA SPEC QL NAA+PROBE: NEGATIVE
SARS-COV-2 RNA RESP QL NAA+PROBE: NEGATIVE
SP GR UR STRIP.AUTO: 1.02
UROBILINOGEN UR STRIP-MCNC: 0.2 MG/DL

## 2024-01-28 PROCEDURE — 3074F SYST BP LT 130 MM HG: CPT

## 2024-01-28 PROCEDURE — 3078F DIAST BP <80 MM HG: CPT

## 2024-01-28 PROCEDURE — 0241U POCT CEPHEID COV-2, FLU A/B, RSV - PCR: CPT

## 2024-01-28 PROCEDURE — 81002 URINALYSIS NONAUTO W/O SCOPE: CPT

## 2024-01-28 PROCEDURE — 81025 URINE PREGNANCY TEST: CPT

## 2024-01-28 PROCEDURE — 99204 OFFICE O/P NEW MOD 45 MIN: CPT

## 2024-01-28 RX ORDER — ONDANSETRON 4 MG/1
4 TABLET, ORALLY DISINTEGRATING ORAL ONCE
Status: COMPLETED | OUTPATIENT
Start: 2024-01-28 | End: 2024-01-28

## 2024-01-28 RX ORDER — ONDANSETRON 4 MG/1
4 TABLET, FILM COATED ORAL EVERY 4 HOURS PRN
Qty: 20 TABLET | Refills: 0 | Status: SHIPPED | OUTPATIENT
Start: 2024-01-28

## 2024-01-28 RX ADMIN — ONDANSETRON 4 MG: 4 TABLET, ORALLY DISINTEGRATING ORAL at 11:41

## 2024-01-28 NOTE — PROGRESS NOTES
"Chief Complaint   Patient presents with    Emesis     X 1 day          Subjective:   HISTORY OF PRESENT ILLNESS: Cristiane Lau is a 33 y.o. female who presents for n/v/d since yesterday.     Patient denies blood n stool or vomit, fevers or abdominal pain.  She is not really holding down any water.     Medications, Allergies, current problem list, Social and Family history reviewed today in Epic.     Objective:     BP 96/52   Pulse (!) 120   Temp 37.1 °C (98.7 °F)   Resp 16   Ht 1.499 m (4' 11\")   Wt 59 kg (130 lb)   SpO2 98%     Physical Exam  Vitals reviewed.   Constitutional:       General: She is not in acute distress.     Appearance: Normal appearance. She is not ill-appearing.   HENT:      Mouth/Throat:      Mouth: Mucous membranes are dry.   Cardiovascular:      Rate and Rhythm: Tachycardia present.      Heart sounds: Normal heart sounds.   Pulmonary:      Effort: Pulmonary effort is normal.      Breath sounds: Normal breath sounds.   Abdominal:      General: Abdomen is flat. Bowel sounds are normal. There is no distension.      Tenderness: There is no abdominal tenderness. There is no right CVA tenderness, left CVA tenderness, guarding or rebound.   Skin:     General: Skin is warm and dry.   Neurological:      Mental Status: She is alert and oriented to person, place, and time.   Psychiatric:         Mood and Affect: Mood normal.          Assessment/Plan:     Diagnosis and associated orders    I personally reviewed prior external notes and test results pertinent to today's visit.     1. Nausea and vomiting, unspecified vomiting type  POCT Urinalysis    POCT Pregnancy    ondansetron (Zofran ODT) dispertab 4 mg    POCT CoV-2, Flu A/B, RSV by PCR    ondansetron (ZOFRAN) 4 MG Tab tablet      2. Dehydration          Results for orders placed or performed in visit on 01/28/24   POCT Urinalysis   Result Value Ref Range    POC Color marlene Negative    POC Appearance slightly cloudy Negative    POC " Glucose negative Negative mg/dL    POC Bilirubin small Negative mg/dL    POC Ketones 80 Negative mg/dL    POC Specific Gravity 1.025 <1.005 - >1.030    POC Blood trace-intact Negative    POC Urine PH 6.0 5.0 - 8.0    POC Protein 30 Negative mg/dL    POC Urobiligen 0.2 Negative (0.2) mg/dL    POC Nitrites negative Negative    POC Leukocyte Esterase negative Negative   POCT Pregnancy   Result Value Ref Range    POC Urine Pregnancy Test Negative     Internal Control Positive Positive     Internal Control Negative Negative    POCT CoV-2, Flu A/B, RSV by PCR   Result Value Ref Range    SARS-CoV-2 by PCR Negative Negative, Invalid    Influenza virus A RNA Negative Negative, Invalid    Influenza virus B, PCR Negative Negative, Invalid    RSV, PCR Negative Negative, Invalid         IMPRESSION:  Pt has stable vital signs and no red flag symptoms identified. Exam reveals a tachycardic pt that is well appearing.  No vomiting noted in clinic, she is given a dose of oral zofran.  Her abd exam is benign.  Informed pt that symptoms are consistent with gastroenteritis and she does have clinical signs of dehydration.  She denies any dizziness on standing, she is making urine.  Advised to use zofran and push oral fluids with electrolytes for the next 24-48 hours.  If she is unable to keep water down despite the use of zofran in the next 6 hours, I advised her to present to ER for IV fluids.  Her urine and preg are  negative    Differential diagnosis discussed. Pt was Educated on red flag symptoms. Pt has been Instructed to return to Urgent Care or nearest Emergency Department if symptoms fail to improve, for any change in condition, further concerns, or new concerning symptoms. Patient states understanding of the plan of care and discharge instructions.  They are discharged in stable condition.         Please note that this dictation was created using voice recognition software. I have made a reasonable attempt to correct obvious  errors, but I expect that there are errors of grammar and possibly content that I did not discover before finalizing the note.    This note was electronically signed by PACHECO Jones

## 2024-04-21 ENCOUNTER — OFFICE VISIT (OUTPATIENT)
Dept: URGENT CARE | Facility: CLINIC | Age: 34
End: 2024-04-21
Payer: COMMERCIAL

## 2024-04-21 VITALS
TEMPERATURE: 97.7 F | WEIGHT: 105 LBS | RESPIRATION RATE: 15 BRPM | DIASTOLIC BLOOD PRESSURE: 50 MMHG | HEART RATE: 80 BPM | HEIGHT: 59 IN | OXYGEN SATURATION: 97 % | BODY MASS INDEX: 21.17 KG/M2 | SYSTOLIC BLOOD PRESSURE: 94 MMHG

## 2024-04-21 DIAGNOSIS — J02.9 SORE THROAT: ICD-10-CM

## 2024-04-21 LAB — S PYO DNA SPEC NAA+PROBE: NOT DETECTED

## 2024-04-21 PROCEDURE — 3074F SYST BP LT 130 MM HG: CPT | Performed by: FAMILY MEDICINE

## 2024-04-21 PROCEDURE — 87651 STREP A DNA AMP PROBE: CPT | Performed by: FAMILY MEDICINE

## 2024-04-21 PROCEDURE — 3078F DIAST BP <80 MM HG: CPT | Performed by: FAMILY MEDICINE

## 2024-04-21 PROCEDURE — 99213 OFFICE O/P EST LOW 20 MIN: CPT | Performed by: FAMILY MEDICINE

## 2024-04-21 RX ORDER — LIDOCAINE HYDROCHLORIDE 20 MG/ML
5 SOLUTION OROPHARYNGEAL EVERY 4 HOURS PRN
Qty: 100 ML | Refills: 0 | Status: SHIPPED | OUTPATIENT
Start: 2024-04-21

## 2024-04-21 ASSESSMENT — ENCOUNTER SYMPTOMS
SORE THROAT: 1
GASTROINTESTINAL NEGATIVE: 1
EYES NEGATIVE: 1
RESPIRATORY NEGATIVE: 1
CONSTITUTIONAL NEGATIVE: 1
CARDIOVASCULAR NEGATIVE: 1

## 2024-04-21 NOTE — PROGRESS NOTES
"Subjective:   Cristiane Lau is a 33 y.o. female who presents for Otalgia (Patient is here today for ear and throat pain. Patient states that this has been going on for a couple of weeks. )      Otalgia   Associated symptoms include a sore throat.       Review of Systems   Constitutional: Negative.    HENT:  Positive for ear pain and sore throat.    Eyes: Negative.    Respiratory: Negative.     Cardiovascular: Negative.    Gastrointestinal: Negative.    Genitourinary: Negative.        Medications, Allergies, and current problem list reviewed today in Epic.     Objective:     BP 94/50   Pulse 80   Temp 36.5 °C (97.7 °F) (Temporal)   Resp 15   Ht 1.499 m (4' 11\")   Wt 47.6 kg (105 lb)   SpO2 97%     Physical Exam  Vitals and nursing note reviewed.   HENT:      Head: Normocephalic and atraumatic.      Right Ear: Tympanic membrane normal.      Left Ear: Tympanic membrane normal.      Nose: Nose normal.      Mouth/Throat:      Pharynx: Posterior oropharyngeal erythema present.   Cardiovascular:      Rate and Rhythm: Normal rate and regular rhythm.      Pulses: Normal pulses.      Heart sounds: Normal heart sounds.   Pulmonary:      Effort: Pulmonary effort is normal.      Breath sounds: Normal breath sounds.   Musculoskeletal:      Cervical back: Tenderness present.         Assessment/Plan:     Diagnosis and associated orders:     1. Sore throat  POCT CEPHEID GROUP A STREP - PCR    lidocaine (XYLOCAINE) 2 % Solution         Comments/MDM:              Differential diagnosis, natural history, supportive care, and indications for immediate follow-up discussed.    Advised the patient to follow-up with the primary care physician for recheck, reevaluation, and consideration of further management.    Please note that this dictation was created using voice recognition software. I have made a reasonable attempt to correct obvious errors, but I expect that there are errors of grammar and possibly content that I did " not discover before finalizing the note.    This note was electronically signed by Joel Arreaga M.D.

## (undated) DEVICE — CHLORAPREP 3 ML APPLICATOR - (25/BX 4BX/CS 100/CS)

## (undated) DEVICE — SLEEVE, SEQUENTIAL CALF REG

## (undated) DEVICE — PACK ROOM TURNOVER L&D (12/CA)

## (undated) DEVICE — BAG SPONGE COUNT 10.25 X 32 - BLUE (250/CA)

## (undated) DEVICE — DRESSING ABDOMINAL PAD STERILE 8 X 10" (360EA/CA)"

## (undated) DEVICE — HEAD HOLDER JUNIOR/ADULT

## (undated) DEVICE — PACK C-SECTION (2EA/CA)

## (undated) DEVICE — SENSOR SPO2 NEO LNCS ADHESIVE (20/BX) SEE USER NOTES

## (undated) DEVICE — TUBE CONNECT SUCTION CLEAR 120 X 1/4" (50EA/CA)"

## (undated) DEVICE — BLANKET UNDERBODY FULL ACCES - (5/CA)

## (undated) DEVICE — WRAP PROBE OXIMETER INFANT UNIVERSAL DESIGN TO FIT NEONATAL FOOT INFANT TOE OR PED FINGER  (12EA/BX)

## (undated) DEVICE — SUTURE 0 VICRYL PLUS CT-1 - 36 INCH (36/BX)

## (undated) DEVICE — SUTURE 4-0 27IN MONOCRYL +ANTI (36PK/BX)

## (undated) DEVICE — SET EXTENSION WITH 2 PORTS (48EA/CA) ***PART #2C8610 IS A SUBSTITUTE*****

## (undated) DEVICE — CHLORAPREP 26 ML APPLICATOR - ORANGE TINT(25/CA)

## (undated) DEVICE — TRAY FOLEY CATHETER STATLOCK 16FR SURESTEP  (10EA/CA)

## (undated) DEVICE — CLOSURE SKIN STRIP 1/2 X 4 IN - (STERI STRIP) (50/BX 4BX/CA)

## (undated) DEVICE — PAD GROUNDING PRE-JELLED - (50EA/PK)

## (undated) DEVICE — PLUMEPEN ULTRA 3/8 IN X 10 FT HOSE (20EA/CA)

## (undated) DEVICE — GLOVE BIOGEL INDICATOR SZ 6.5 SURGICAL PF LTX - (50PR/BX 4BX/CA)

## (undated) DEVICE — GLOVE BIOGEL SZ 6.5 SURGICAL PF LTX (50PR/BX 4BX/CA)

## (undated) DEVICE — CANISTER SUCTION 3000ML MECHANICAL FILTER AUTO SHUTOFF MEDI-VAC NONSTERILE LF DISP  (40EA/CA)

## (undated) DEVICE — WATER IRRIGATION STERILE 1000ML (12EA/CA)

## (undated) DEVICE — SUTUREABS02-0 CT1 27IN - (36EA/BX)

## (undated) DEVICE — ELECTRODE RADIOLUCENT LF 3PK - RED DOT (3/PK 200PK/CA)

## (undated) DEVICE — BLANKET STERILE CHICKIE FOR L&D (100/CA)

## (undated) DEVICE — KIT  I.V. START (100EA/CA)

## (undated) DEVICE — TRAY SPINAL ANESTHESIA NON-SAFETY (10/CA)

## (undated) DEVICE — SOLUTION PLASMA-LYTE PH 7.4 INJ 1000ML  (14EA/CA)

## (undated) DEVICE — SENSOR SPO2 ADULT LNCS ADTX (20/BX) ORDER ITEM #19593

## (undated) DEVICE — SUTURE 0 VICRYL PLUS CTX - 36 INCH (36/BX)

## (undated) DEVICE — SUCTION INSTRUMENT YANKAUER BULBOUS TIP W/O VENT (50EA/CA)

## (undated) DEVICE — CATHETER IV NON-SAFETY 18 GA X 1 1/4 (50/BX 4BX/CA)

## (undated) DEVICE — CANNULA O2 COMFORT SOFT EAR ADULT 7 FT TUBING (50/CA)

## (undated) DEVICE — SODIUM CHL IRRIGATION 0.9% 1000ML (12EA/CA)